# Patient Record
Sex: MALE | Race: WHITE | NOT HISPANIC OR LATINO | Employment: OTHER | URBAN - METROPOLITAN AREA
[De-identification: names, ages, dates, MRNs, and addresses within clinical notes are randomized per-mention and may not be internally consistent; named-entity substitution may affect disease eponyms.]

---

## 2023-04-01 PROBLEM — I73.9 PERIPHERAL ARTERY DISEASE (CMS-HCC): Status: ACTIVE | Noted: 2023-04-01

## 2023-04-01 PROBLEM — M25.552 LEFT HIP PAIN: Status: ACTIVE | Noted: 2023-04-01

## 2023-04-01 PROBLEM — I10 HYPERTENSION: Status: ACTIVE | Noted: 2023-04-01

## 2023-04-01 PROBLEM — K27.9 PEPTIC ULCER: Status: ACTIVE | Noted: 2023-04-01

## 2023-04-01 PROBLEM — R94.4 DECREASED GFR: Status: ACTIVE | Noted: 2023-04-01

## 2023-04-01 PROBLEM — E83.19 IRON EXCESS: Status: ACTIVE | Noted: 2023-04-01

## 2023-04-01 PROBLEM — R01.1 HEART MURMUR: Status: ACTIVE | Noted: 2023-04-01

## 2023-04-01 PROBLEM — I49.9 IRREGULAR HEART RHYTHM: Status: ACTIVE | Noted: 2023-04-01

## 2023-04-01 PROBLEM — M25.522 LEFT ELBOW PAIN: Status: ACTIVE | Noted: 2023-04-01

## 2023-04-01 PROBLEM — D64.9 ANEMIA, MILD: Status: ACTIVE | Noted: 2023-04-01

## 2023-04-01 PROBLEM — R73.03 PREDIABETES: Status: ACTIVE | Noted: 2023-04-01

## 2023-04-01 RX ORDER — LISINOPRIL 30 MG/1
30 TABLET ORAL DAILY
COMMUNITY
End: 2023-05-03 | Stop reason: SDUPTHER

## 2023-04-01 RX ORDER — ASPIRIN 81 MG/1
1 TABLET ORAL DAILY
COMMUNITY
Start: 2020-07-24 | End: 2023-10-23 | Stop reason: ALTCHOICE

## 2023-04-02 PROBLEM — M25.552 LEFT HIP PAIN: Status: RESOLVED | Noted: 2023-04-01 | Resolved: 2023-04-02

## 2023-04-02 PROBLEM — I49.9 IRREGULAR HEART RHYTHM: Status: RESOLVED | Noted: 2023-04-01 | Resolved: 2023-04-02

## 2023-04-02 PROBLEM — F17.200 CURRENT SMOKER: Status: ACTIVE | Noted: 2023-04-02

## 2023-04-02 PROBLEM — D64.9 ANEMIA, MILD: Status: RESOLVED | Noted: 2023-04-01 | Resolved: 2023-04-02

## 2023-04-02 PROBLEM — E83.19 IRON EXCESS: Status: RESOLVED | Noted: 2023-04-01 | Resolved: 2023-04-02

## 2023-04-02 ASSESSMENT — ENCOUNTER SYMPTOMS
WHEEZING: 0
SHORTNESS OF BREATH: 0
CONSTITUTIONAL NEGATIVE: 1

## 2023-04-02 NOTE — PROGRESS NOTES
Subjective   Patient ID: Anand Hartman is a 82 y.o. male who presents for Follow-up (3 month follow up for blood sugar/Fasting-No/Bps 120/80 a few days ago/).  Last physical: 9/8/22    Is pt fasting? no  BPs at home-120/80 a few dasy ago  Poc a1c=5.9        HPI  Last labs-9/14/22 cmp-sugar 111-gfr 57, ldl 107, psa nl, cbc nl  Due for labs- cmp    Cholesterol   Date Value Ref Range Status   08/27/2021 157 0 - 199 mg/dL Final     Comment:     .      AGE      DESIRABLE   BORDERLINE HIGH   HIGH     0-19 Y     0 - 169       170 - 199     >/= 200    20-24 Y     0 - 189       190 - 224     >/= 225         >24 Y     0 - 199       200 - 239     >/= 240   **All ranges are based on fasting samples. Specific   therapeutic targets will vary based on patient-specific   cardiac risk.  .   Pediatric guidelines reference:Pediatrics 2011, 128(S5).   Adult guidelines reference: NCEP ATPIII Guidelines,     COLLETTE 2001, 258:2486-97  .   Venipuncture immediately after or during the    administration of Metamizole may lead to falsely   low results. Testing should be performed immediately   prior to Metamizole dosing.     11/25/2020 110 0 - 199 mg/dL Final     Comment:     .      AGE      DESIRABLE   BORDERLINE HIGH   HIGH     0-19 Y     0 - 169       170 - 199     >/= 200    20-24 Y     0 - 189       190 - 224     >/= 225         >24 Y     0 - 199       200 - 239     >/= 240   **All ranges are based on fasting samples. Specific   therapeutic targets will vary based on patient-specific   cardiac risk.  .   Pediatric guidelines reference:Pediatrics 2011, 128(S5).   Adult guidelines reference: NCEP ATPIII Guidelines,     COLLETTE 2001, 258:2486-97  .   Venipuncture immediately after or during the    administration of Metamizole may lead to falsely   low results. Testing should be performed immediately   prior to Metamizole dosing.     03/09/2020 163 0 - 199 mg/dL Final     Comment:     .      AGE      DESIRABLE   BORDERLINE HIGH   HIGH     0-19  Y     0 - 169       170 - 199     >/= 200    20-24 Y     0 - 189       190 - 224     >/= 225         >24 Y     0 - 199       200 - 239     >/= 240   **All ranges are based on fasting samples. Specific   therapeutic targets will vary based on patient-specific   cardiac risk.  .   Pediatric guidelines reference:Pediatrics 2011, 128(S5).   Adult guidelines reference: NCEP ATPIII Guidelines,     COLLETTE 2001, 258:2486-97  .   Venipuncture immediately after or during the    administration of Metamizole may lead to falsely   low results. Testing should be performed immediately   prior to Metamizole dosing.       Triglycerides   Date Value Ref Range Status   08/27/2021 103 0 - 149 mg/dL Final     Comment:     .      AGE      DESIRABLE   BORDERLINE HIGH   HIGH     VERY HIGH   0 D-90 D    19 - 174         ----         ----        ----  91 D- 9 Y     0 -  74        75 -  99     >/= 100      ----    10-19 Y     0 -  89        90 - 129     >/= 130      ----    20-24 Y     0 - 114       115 - 149     >/= 150      ----         >24 Y     0 - 149       150 - 199    200- 499    >/= 500  .   Venipuncture immediately after or during the    administration of Metamizole may lead to falsely   low results. Testing should be performed immediately   prior to Metamizole dosing.     11/25/2020 70 0 - 149 mg/dL Final     Comment:     .      AGE      DESIRABLE   BORDERLINE HIGH   HIGH     VERY HIGH   0 D-90 D    19 - 174         ----         ----        ----  91 D- 9 Y     0 -  74        75 -  99     >/= 100      ----    10-19 Y     0 -  89        90 - 129     >/= 130      ----    20-24 Y     0 - 114       115 - 149     >/= 150      ----         >24 Y     0 - 149       150 - 199    200- 499    >/= 500  .   Venipuncture immediately after or during the    administration of Metamizole may lead to falsely   low results. Testing should be performed immediately   prior to Metamizole dosing.     03/09/2020 89 0 - 149 mg/dL Final     Comment:     .       AGE      DESIRABLE   BORDERLINE HIGH   HIGH     VERY HIGH   0 D-90 D    19 - 174         ----         ----        ----  91 D- 9 Y     0 -  74        75 -  99     >/= 100      ----    10-19 Y     0 -  89        90 - 129     >/= 130      ----    20-24 Y     0 - 114       115 - 149     >/= 150      ----         >24 Y     0 - 149       150 - 199    200- 499    >/= 500  .   Venipuncture immediately after or during the    administration of Metamizole may lead to falsely   low results. Testing should be performed immediately   prior to Metamizole dosing.       HDL   Date Value Ref Range Status   08/27/2021 40.4 mg/dL Final     Comment:     .      AGE      VERY LOW   LOW     NORMAL    HIGH       0-19 Y       < 35   < 40     40-45     ----    20-24 Y       ----   < 40       >45     ----      >24 Y       ----   < 40     40-60      >60  .     11/25/2020 41.6 mg/dL Final     Comment:     .      AGE      VERY LOW   LOW     NORMAL    HIGH       0-19 Y       < 35   < 40     40-45     ----    20-24 Y       ----   < 40       >45     ----      >24 Y       ----   < 40     40-60      >60  .     03/09/2020 42.9 mg/dL Final     Comment:     .      AGE      VERY LOW   LOW     NORMAL    HIGH       0-19 Y       < 35   < 40     40-45     ----    20-24 Y       ----   < 40       >45     ----      >24 Y       ----   < 40     40-60      >60  .       No results found for: LDL  TSH   Date Value Ref Range Status   08/27/2021 1.89 0.44 - 3.98 mIU/L Final     Comment:      TSH testing is performed using different testing    methodology at [facility] than at other    [facility]. Direct result comparisons should    only be made within the same method.       No components found for: A1C  No components found for: POCA1C  No results found for: ALBUR  No components found for: POCALBUR    Exercise-walking  Diet-breakfast- coffee with sugar or black;  2-3 eggs and toast (white)  Lunch-leftovers, sandwich, soup; coffee  Dinner-what he  finds; coffee, iced tea  Snacks-anything he can find  Etoh none    Immunization History   Administered Date(s) Administered    Moderna SARS-CoV-2 Vaccination 03/04/2021, 04/02/2021        No care team member to display     Review of Systems   Constitutional: Negative.    Respiratory:  Negative for shortness of breath and wheezing.    Cardiovascular:  Negative for chest pain.       Objective   Visit Vitals  /69   Pulse 101   Temp 35.8 °C (96.4 °F)      BP Readings from Last 3 Encounters:   04/05/23 138/69   12/05/22 (!) 160/92   09/08/22 162/88     Wt Readings from Last 3 Encounters:   04/05/23 52.1 kg (114 lb 12.8 oz)   12/05/22 52.9 kg (116 lb 9.6 oz)   09/12/22 52.2 kg (115 lb)       The ASCVD Risk score (Anuradha THOMASON, et al., 2019) failed to calculate for the following reasons:    The 2019 ASCVD risk score is only valid for ages 40 to 79     Physical Exam  Constitutional:       General: He is not in acute distress.     Appearance: Normal appearance.   Neurological:      Mental Status: He is alert.       Assessment/Plan   Diagnoses and all orders for this visit:  Primary hypertension  -     Comprehensive Metabolic Panel; Future  Prediabetes  -     POCT glycosylated hemoglobin (Hb A1C) manually resulted  Decreased GFR  -     Comprehensive Metabolic Panel; Future

## 2023-04-05 ENCOUNTER — OFFICE VISIT (OUTPATIENT)
Dept: PRIMARY CARE | Facility: CLINIC | Age: 83
End: 2023-04-05
Payer: COMMERCIAL

## 2023-04-05 VITALS
WEIGHT: 114.8 LBS | DIASTOLIC BLOOD PRESSURE: 69 MMHG | SYSTOLIC BLOOD PRESSURE: 138 MMHG | HEART RATE: 101 BPM | HEIGHT: 66 IN | TEMPERATURE: 96.4 F | BODY MASS INDEX: 18.45 KG/M2

## 2023-04-05 DIAGNOSIS — E46 PROTEIN-CALORIE MALNUTRITION, UNSPECIFIED SEVERITY (MULTI): ICD-10-CM

## 2023-04-05 DIAGNOSIS — R94.4 DECREASED GFR: ICD-10-CM

## 2023-04-05 DIAGNOSIS — I73.9 PERIPHERAL ARTERY DISEASE (CMS-HCC): ICD-10-CM

## 2023-04-05 DIAGNOSIS — I10 PRIMARY HYPERTENSION: Primary | ICD-10-CM

## 2023-04-05 DIAGNOSIS — R73.03 PREDIABETES: ICD-10-CM

## 2023-04-05 LAB — POC HEMOGLOBIN A1C: 5.9 % (ref 4.2–6.5)

## 2023-04-05 PROCEDURE — 99214 OFFICE O/P EST MOD 30 MIN: CPT | Performed by: NURSE PRACTITIONER

## 2023-04-05 PROCEDURE — 83036 HEMOGLOBIN GLYCOSYLATED A1C: CPT | Performed by: NURSE PRACTITIONER

## 2023-04-05 PROCEDURE — 3078F DIAST BP <80 MM HG: CPT | Performed by: NURSE PRACTITIONER

## 2023-04-05 PROCEDURE — 3075F SYST BP GE 130 - 139MM HG: CPT | Performed by: NURSE PRACTITIONER

## 2023-04-05 PROCEDURE — 1160F RVW MEDS BY RX/DR IN RCRD: CPT | Performed by: NURSE PRACTITIONER

## 2023-04-05 PROCEDURE — 1159F MED LIST DOCD IN RCRD: CPT | Performed by: NURSE PRACTITIONER

## 2023-04-05 ASSESSMENT — PATIENT HEALTH QUESTIONNAIRE - PHQ9
2. FEELING DOWN, DEPRESSED OR HOPELESS: NOT AT ALL
1. LITTLE INTEREST OR PLEASURE IN DOING THINGS: NOT AT ALL
SUM OF ALL RESPONSES TO PHQ9 QUESTIONS 1 AND 2: 0

## 2023-04-05 NOTE — PATIENT INSTRUCTIONS
A1C today=5.9  This is the 3 month average of your sugars.  You are in the normal range which is 5.6 or less.    You can use the lab in our building when fasting. The hrs are: Monday-Thursday, 7 a.m. - 6 p.m., Friday, 7 a.m. - 5 p.m., Saturday 8 a.m. - 12 noon.   No appt needed, BUT YOU DO NEED THE PAPER ORDER.    Fasting is no food, drink, gum or mints other than water for 8-12 hrs.   Results will be back in 2-3 business days for most labs. It is always recommended for any orders (labs, xrays, ultrasounds,MRI, ct scan, procedures etc) to check with your insurance provider for expected costs or expenses to you.       Goal LDL <100 (107)  Goal sugar fasting <100 (111)  Kidney function stable at last lab  Goal trigs <150  Goal HDL >50  Exercise-cardio 4-5d/wk 30min each day  Diet-Breakfast-toast (my favorite Pooja Watson Delightful multi-grain and Sixto's Killer Bread thin-sliced Good Seed)/bagel/English muffin-whole wheat flour as a 1st ingredient or cereal/oatmeal/granola bar-fiber 4g or more; protein like eggs or peanut butter is Ok  Lunch-protein, veg 1c  Dinner-protein, veg 1c; if having potatoes, rice, noodles, or pasta-->fist sized; could try brown rice or whole wheat pasta or quinoa  Fruit 2 a day  Dairy 2 a day-milk, almond milk, soy milk, yogurt, cottage cheese, yogurt  Snacks-Protein-hard boiled egg, nuts (walnuts/almonds/pecans/pistachios 1/4c), hummus, beef/deer jerky; vegetable, fruit, dairy-milk(1%, skim, almond, soy)/cheese (not a lot of cheddar)/yogurt (Greek is best-my favorite Dannon Fruit on the Bottom Greek)/cottage cheese 2%; triscuits, popcorn have a lot of fiber; serving size  Water  Limit alcohol to 2 drinks per day (1 drink=12oz beer or 5oz wine or 1 1/2oz liquor)  appt in 6   months; be fasting      I will communicate with you via Cadee regarding messages and results. If you need help with this, you can call the support line at [phone].    IT WAS A PLEASURE TO SEE YOU TODAY. THANK YOU FOR  CHOOSING US FOR YOUR HEALTHCARE NEEDS.

## 2023-04-18 LAB
NON-UH HIE A/G RATIO: 1.3
NON-UH HIE ALB: 3.9 G/DL (ref 3.4–5)
NON-UH HIE ALK PHOS: 116 UNIT/L (ref 46–116)
NON-UH HIE BILIRUBIN, TOTAL: 0.7 MG/DL (ref 0.2–1)
NON-UH HIE BUN/CREAT RATIO: 16.2
NON-UH HIE BUN: 21 MG/DL (ref 9–23)
NON-UH HIE CALCIUM: 9.2 MG/DL (ref 8.7–10.4)
NON-UH HIE CALCULATED OSMOLALITY: 281 MOSM/KG (ref 275–295)
NON-UH HIE CHLORIDE: 107 MMOL/L (ref 98–107)
NON-UH HIE CO2, VENOUS: 24 MMOL/L (ref 20–31)
NON-UH HIE CREATININE: 1.3 MG/DL (ref 0.6–1.1)
NON-UH HIE GFR AA: >60
NON-UH HIE GLOBULIN: 2.9 G/DL
NON-UH HIE GLOMERULAR FILTRATION RATE: 52 ML/MIN/1.73M?
NON-UH HIE GLUCOSE: 107 MG/DL (ref 74–106)
NON-UH HIE GOT: 19 UNIT/L (ref 15–37)
NON-UH HIE GPT: 10 UNIT/L (ref 10–49)
NON-UH HIE K: 4.5 MMOL/L (ref 3.5–5.1)
NON-UH HIE NA: 139 MMOL/L (ref 135–145)
NON-UH HIE TOTAL PROTEIN: 6.8 G/DL (ref 5.7–8.2)

## 2023-04-19 ENCOUNTER — TELEPHONE (OUTPATIENT)
Dept: PRIMARY CARE | Facility: CLINIC | Age: 83
End: 2023-04-19
Payer: COMMERCIAL

## 2023-04-19 NOTE — TELEPHONE ENCOUNTER
----- Message from TIMBO Gardner-CNP sent at 4/18/2023  5:09 PM EDT -----  Sugar (aka glucose), liver function and electrolytes in the CMP (comprehensive metabolic panel) were normal.  Kidney function stable  If pt was not fasting sugar is normal

## 2023-05-03 ENCOUNTER — TELEPHONE (OUTPATIENT)
Dept: PRIMARY CARE | Facility: CLINIC | Age: 83
End: 2023-05-03
Payer: COMMERCIAL

## 2023-05-03 DIAGNOSIS — I10 PRIMARY HYPERTENSION: Primary | ICD-10-CM

## 2023-05-03 RX ORDER — LISINOPRIL 30 MG/1
30 TABLET ORAL DAILY
Qty: 90 TABLET | Refills: 1 | Status: SHIPPED | OUTPATIENT
Start: 2023-05-03 | End: 2023-11-20

## 2023-09-14 ASSESSMENT — ENCOUNTER SYMPTOMS
SORE THROAT: 0
FREQUENCY: 0
DYSPHORIC MOOD: 0
CONSTIPATION: 0
ARTHRALGIAS: 0
POLYPHAGIA: 0
ABDOMINAL PAIN: 0
EYE REDNESS: 0
DIZZINESS: 0
WHEEZING: 0
FEVER: 0
DYSURIA: 0
FATIGUE: 0
NAUSEA: 0
EYE DISCHARGE: 0
CHILLS: 0
HEADACHES: 0
SHORTNESS OF BREATH: 0
BRUISES/BLEEDS EASILY: 0
ADENOPATHY: 0
MYALGIAS: 0
RHINORRHEA: 0
COUGH: 0
BLOOD IN STOOL: 0
POLYDIPSIA: 0
DIARRHEA: 0
PALPITATIONS: 0
VOMITING: 0
NERVOUS/ANXIOUS: 0
HEMATURIA: 0

## 2023-09-14 NOTE — PROGRESS NOTES
Subjective   Patient ID: Anand Hartman is a 83 y.o. male who presents for Medicare Annual Wellness Visit Subsequent.    This is a medicare wellness. Pls ask all medicare questions  Is pt fasting? No  Does pt see any providers other than care team below:  No  IMMANUEL Saucedo  Does pt want to discuss quitting smoking? No  Bps at home-averaging the same as today, only checking occasionally   Does pt want flu shot? No  Prevnar-    No care team member to display    HPI  Last labs-4/2023 A1c 5.9, kidney function stable; 9/14/22 cmp-sugar 111-gfr 57, ldl 107, psa nl, cbc nl   Due for labs- all    Cholesterol   Date Value Ref Range Status   08/27/2021 157 0 - 199 mg/dL Final     Comment:     .      AGE      DESIRABLE   BORDERLINE HIGH   HIGH     0-19 Y     0 - 169       170 - 199     >/= 200    20-24 Y     0 - 189       190 - 224     >/= 225         >24 Y     0 - 199       200 - 239     >/= 240   **All ranges are based on fasting samples. Specific   therapeutic targets will vary based on patient-specific   cardiac risk.  .   Pediatric guidelines reference:Pediatrics 2011, 128(S5).   Adult guidelines reference: NCEP ATPIII Guidelines,     COLLETTE 2001, 258:2486-97  .   Venipuncture immediately after or during the    administration of Metamizole may lead to falsely   low results. Testing should be performed immediately   prior to Metamizole dosing.     11/25/2020 110 0 - 199 mg/dL Final     Comment:     .      AGE      DESIRABLE   BORDERLINE HIGH   HIGH     0-19 Y     0 - 169       170 - 199     >/= 200    20-24 Y     0 - 189       190 - 224     >/= 225         >24 Y     0 - 199       200 - 239     >/= 240   **All ranges are based on fasting samples. Specific   therapeutic targets will vary based on patient-specific   cardiac risk.  .   Pediatric guidelines reference:Pediatrics 2011, 128(S5).   Adult guidelines reference: NCEP ATPIII Guidelines,     COLLETTE 2001, 258:2486-97  .   Venipuncture immediately after or during the     administration of Metamizole may lead to falsely   low results. Testing should be performed immediately   prior to Metamizole dosing.     03/09/2020 163 0 - 199 mg/dL Final     Comment:     .      AGE      DESIRABLE   BORDERLINE HIGH   HIGH     0-19 Y     0 - 169       170 - 199     >/= 200    20-24 Y     0 - 189       190 - 224     >/= 225         >24 Y     0 - 199       200 - 239     >/= 240   **All ranges are based on fasting samples. Specific   therapeutic targets will vary based on patient-specific   cardiac risk.  .   Pediatric guidelines reference:Pediatrics 2011, 128(S5).   Adult guidelines reference: NCEP ATPIII Guidelines,     COLLETTE 2001, 258:2486-97  .   Venipuncture immediately after or during the    administration of Metamizole may lead to falsely   low results. Testing should be performed immediately   prior to Metamizole dosing.       Triglycerides   Date Value Ref Range Status   08/27/2021 103 0 - 149 mg/dL Final     Comment:     .      AGE      DESIRABLE   BORDERLINE HIGH   HIGH     VERY HIGH   0 D-90 D    19 - 174         ----         ----        ----  91 D- 9 Y     0 -  74        75 -  99     >/= 100      ----    10-19 Y     0 -  89        90 - 129     >/= 130      ----    20-24 Y     0 - 114       115 - 149     >/= 150      ----         >24 Y     0 - 149       150 - 199    200- 499    >/= 500  .   Venipuncture immediately after or during the    administration of Metamizole may lead to falsely   low results. Testing should be performed immediately   prior to Metamizole dosing.     11/25/2020 70 0 - 149 mg/dL Final     Comment:     .      AGE      DESIRABLE   BORDERLINE HIGH   HIGH     VERY HIGH   0 D-90 D    19 - 174         ----         ----        ----  91 D- 9 Y     0 -  74        75 -  99     >/= 100      ----    10-19 Y     0 -  89        90 - 129     >/= 130      ----    20-24 Y     0 - 114       115 - 149     >/= 150      ----         >24 Y     0 - 149       150 - 199    200- 499    >/=  "500  .   Venipuncture immediately after or during the    administration of Metamizole may lead to falsely   low results. Testing should be performed immediately   prior to Metamizole dosing.     03/09/2020 89 0 - 149 mg/dL Final     Comment:     .      AGE      DESIRABLE   BORDERLINE HIGH   HIGH     VERY HIGH   0 D-90 D    19 - 174         ----         ----        ----  91 D- 9 Y     0 -  74        75 -  99     >/= 100      ----    10-19 Y     0 -  89        90 - 129     >/= 130      ----    20-24 Y     0 - 114       115 - 149     >/= 150      ----         >24 Y     0 - 149       150 - 199    200- 499    >/= 500  .   Venipuncture immediately after or during the    administration of Metamizole may lead to falsely   low results. Testing should be performed immediately   prior to Metamizole dosing.       HDL   Date Value Ref Range Status   08/27/2021 40.4 mg/dL Final     Comment:     .      AGE      VERY LOW   LOW     NORMAL    HIGH       0-19 Y       < 35   < 40     40-45     ----    20-24 Y       ----   < 40       >45     ----      >24 Y       ----   < 40     40-60      >60  .     11/25/2020 41.6 mg/dL Final     Comment:     .      AGE      VERY LOW   LOW     NORMAL    HIGH       0-19 Y       < 35   < 40     40-45     ----    20-24 Y       ----   < 40       >45     ----      >24 Y       ----   < 40     40-60      >60  .     03/09/2020 42.9 mg/dL Final     Comment:     .      AGE      VERY LOW   LOW     NORMAL    HIGH       0-19 Y       < 35   < 40     40-45     ----    20-24 Y       ----   < 40       >45     ----      >24 Y       ----   < 40     40-60      >60  .       No results found for: \"LDL\"  TSH   Date Value Ref Range Status   08/27/2021 1.89 0.44 - 3.98 mIU/L Final     Comment:      TSH testing is performed using different testing    methodology at [facility] than at other    [facility] hospitals. Direct result comparisons should    only be made within the same method.       No results found for: " "\"A1C\"  No components found for: \"POCA1C\"  No results found for: \"ALBUR\"  No components found for: \"POCALBUR\"      Other concerns: if walks too far, lf hip gives out x 1yr  Clears up if rests  No redness rash or swelling  No fall or injury      ER/urgicare visits in the last year- none  Hospitalizations in the last year- none    FH colon ca-none  FH prostate ca-none      Exercise- walking  Diet-when hungry   Body mass index is 18.01 kg/m².    last eye dr appt- 1 wk ago  No vision issues    last dental appt- top and bottom dentures    BMs-regular  Sleep-able to fall asleep and stay asleep; no snoring or apnea  no cp, swelling, sob, abd pain, n/v/d/c, blood in stool or black stools  STI testing including hiv (age 15-65) and hep c screening (18-79)-declines  PSA 50-85 with labs      Immunization History   Administered Date(s) Administered    Moderna SARS-CoV-2 Vaccination 03/04/2021, 04/02/2021       fractures in lifetime-none  FH hip/spine/wrist/humerus fx in mom, dad or sibs-none    FH heart attack, heart surgery-mom-pacemaker,  Dad-cad    FH stroke-dad    The ASCVD Risk score (Anuradha DK, et al., 2019) failed to calculate for the following reasons:    The 2019 ASCVD risk score is only valid for ages 40 to 79  Coronary Artery Calcium score:  This test is recommended for men 45 or older and women 55 or older without a history of heart disease and have 1 risk factor (high LDL cholesterol, low HDL cholesterol, high blood pressure, smoker (current or past), type 2 diabetes, IBD, lupus, RA, ankylosing spondylitis, psoriasis or family history of  heart disease <55yrs in dad, brother or child or <65yrs in mom, sister, or child.)       Review of Systems   Constitutional:  Negative for chills, fatigue and fever.   HENT:  Negative for congestion, ear pain, postnasal drip, rhinorrhea and sore throat.    Eyes:  Negative for discharge, redness and visual disturbance.   Respiratory:  Negative for cough, shortness of breath and " wheezing.    Cardiovascular:  Negative for chest pain, palpitations and leg swelling.   Gastrointestinal:  Negative for abdominal pain, blood in stool, constipation, diarrhea, nausea and vomiting.   Endocrine: Negative for cold intolerance, polydipsia, polyphagia and polyuria.   Genitourinary:  Negative for dysuria, frequency, genital sores, hematuria, penile pain, testicular pain and urgency.   Musculoskeletal:  Negative for arthralgias and myalgias.   Skin:  Negative for rash.   Neurological:  Negative for dizziness, syncope and headaches.   Hematological:  Negative for adenopathy. Does not bruise/bleed easily.   Psychiatric/Behavioral:  Negative for dysphoric mood. The patient is not nervous/anxious.        Objective   Visit Vitals  /84   Pulse 78   Temp 36.7 °C (98 °F)      BP Readings from Last 3 Encounters:   09/15/23 127/84   04/05/23 138/69   01/06/23 101/60     Wt Readings from Last 3 Encounters:   09/15/23 50.6 kg (111 lb 9.6 oz)   04/05/23 52.1 kg (114 lb 12.8 oz)   01/06/23 51.7 kg (114 lb)           Physical Exam  Vitals reviewed.   Constitutional:       General: He is not in acute distress.     Appearance: Normal appearance. He is not ill-appearing.   HENT:      Head: Normocephalic.      Right Ear: Tympanic membrane, ear canal and external ear normal.      Left Ear: Tympanic membrane, ear canal and external ear normal.      Nose: Nose normal.      Mouth/Throat:      Mouth: Mucous membranes are moist.      Pharynx: Oropharynx is clear. No oropharyngeal exudate or posterior oropharyngeal erythema.   Eyes:      Extraocular Movements: Extraocular movements intact.      Conjunctiva/sclera: Conjunctivae normal.      Pupils: Pupils are equal, round, and reactive to light.   Cardiovascular:      Rate and Rhythm: Normal rate and regular rhythm.      Heart sounds: Normal heart sounds. No murmur heard.  Pulmonary:      Effort: Pulmonary effort is normal. No respiratory distress.      Breath sounds: Normal  breath sounds. No wheezing, rhonchi or rales.   Abdominal:      General: Bowel sounds are normal. There is no distension.      Palpations: Abdomen is soft. There is no mass.      Tenderness: There is no abdominal tenderness.   Musculoskeletal:         General: No swelling or tenderness. Normal range of motion.      Cervical back: Normal range of motion and neck supple. No tenderness.      Right lower leg: No edema.      Left lower leg: No edema.   Lymphadenopathy:      Cervical: No cervical adenopathy.   Skin:     General: Skin is warm.      Findings: No rash.   Neurological:      General: No focal deficit present.      Mental Status: He is alert and oriented to person, place, and time.      Cranial Nerves: No cranial nerve deficit.   Psychiatric:         Mood and Affect: Mood normal.         Behavior: Behavior normal.         Assessment/Plan   Diagnoses and all orders for this visit:  Routine general medical examination at a health care facility  Primary hypertension  -     Lipid Panel; Future  -     Comprehensive Metabolic Panel; Future  -     CBC and Auto Differential; Future  Prediabetes  -     Comprehensive Metabolic Panel; Future  -     Hemoglobin A1c; Future  Screening for malignant neoplasm of prostate  -     Prostate Specific Antigen, Screen; Future  BMI less than 19,adult  Left hip pain  -     XR hip left 2 or 3 views; Future  -     Referral to Orthopaedic Surgery; Future  Other orders  -     Follow Up In Primary Care - Established; Future

## 2023-09-15 ENCOUNTER — OFFICE VISIT (OUTPATIENT)
Dept: PRIMARY CARE | Facility: CLINIC | Age: 83
End: 2023-09-15
Payer: COMMERCIAL

## 2023-09-15 VITALS
HEIGHT: 66 IN | HEART RATE: 78 BPM | SYSTOLIC BLOOD PRESSURE: 127 MMHG | DIASTOLIC BLOOD PRESSURE: 84 MMHG | TEMPERATURE: 98 F | BODY MASS INDEX: 17.94 KG/M2 | WEIGHT: 111.6 LBS

## 2023-09-15 DIAGNOSIS — Z00.00 ROUTINE GENERAL MEDICAL EXAMINATION AT A HEALTH CARE FACILITY: Primary | ICD-10-CM

## 2023-09-15 DIAGNOSIS — R73.03 PREDIABETES: ICD-10-CM

## 2023-09-15 DIAGNOSIS — Z12.5 SCREENING FOR MALIGNANT NEOPLASM OF PROSTATE: ICD-10-CM

## 2023-09-15 DIAGNOSIS — I10 PRIMARY HYPERTENSION: ICD-10-CM

## 2023-09-15 DIAGNOSIS — M25.552 LEFT HIP PAIN: ICD-10-CM

## 2023-09-15 PROCEDURE — 1159F MED LIST DOCD IN RCRD: CPT | Performed by: NURSE PRACTITIONER

## 2023-09-15 PROCEDURE — 1160F RVW MEDS BY RX/DR IN RCRD: CPT | Performed by: NURSE PRACTITIONER

## 2023-09-15 PROCEDURE — G0444 DEPRESSION SCREEN ANNUAL: HCPCS | Performed by: NURSE PRACTITIONER

## 2023-09-15 PROCEDURE — 1126F AMNT PAIN NOTED NONE PRSNT: CPT | Performed by: NURSE PRACTITIONER

## 2023-09-15 PROCEDURE — 3074F SYST BP LT 130 MM HG: CPT | Performed by: NURSE PRACTITIONER

## 2023-09-15 PROCEDURE — G0439 PPPS, SUBSEQ VISIT: HCPCS | Performed by: NURSE PRACTITIONER

## 2023-09-15 PROCEDURE — 3079F DIAST BP 80-89 MM HG: CPT | Performed by: NURSE PRACTITIONER

## 2023-09-15 PROCEDURE — 1170F FXNL STATUS ASSESSED: CPT | Performed by: NURSE PRACTITIONER

## 2023-09-15 ASSESSMENT — PATIENT HEALTH QUESTIONNAIRE - PHQ9
2. FEELING DOWN, DEPRESSED OR HOPELESS: NOT AT ALL
SUM OF ALL RESPONSES TO PHQ9 QUESTIONS 1 AND 2: 0
1. LITTLE INTEREST OR PLEASURE IN DOING THINGS: NOT AT ALL

## 2023-09-15 ASSESSMENT — ACTIVITIES OF DAILY LIVING (ADL)
DOING_HOUSEWORK: INDEPENDENT
MANAGING_FINANCES: INDEPENDENT
DRESSING: INDEPENDENT
TAKING_MEDICATION: INDEPENDENT
GROCERY_SHOPPING: INDEPENDENT
BATHING: INDEPENDENT

## 2023-09-15 NOTE — PATIENT INSTRUCTIONS
Xray hip today  Refer to hip ortho       Handouts given to pt:  physical handout    stop smoking      Labs:    You can use the lab in our building when fasting. The hrs are: Monday-Friday, 7 a.m. - 5 p.m., Saturday 8 a.m. - 12 noon.   No appt needed, BUT YOU DO NEED THE PAPER ORDER.    Fasting is no food, drink, gum or mints other than water for 12 hrs.   Results will be back in 2-3 business days for most labs. It is always recommended for any orders (labs, xrays, ultrasounds,MRI, ct scan, procedures etc) to check with your insurance provider for expected costs or expenses to you.         You will get your results via phone from my medical assistant if you do not have MyChart.  OR  You will get your results via CDPt    If a result is urgent, I will call to speak to you.    Vaccines:  Declines flu shot    General recommendations:  Exercise-cardio 4-5d/wk 30min each day  Diet-Breakfast-toast (my favorite Pooja Watson Delighful Multigrain or Sixto's Killer Bread Good Seed thin-sliced)/bagel/English muffin-whole wheat flour as a 1st ingredient or cereal/oatmeal/granola bar-fiber 4g or more or protein like eggs or peanut butter; optional veggies  Lunch-protein, 1/2c carb or 2 slices bread, veg 1c  Dinner-protein, fist sized carb, veg 1c  Fruit 2 a day  Dairy 2 a day-milk, soy milk, almond milk, cheese, yogurt, cottage cheese  Snacks-Protein-hard boiled egg, nuts (walnuts/almonds/pecans/pistachios 1/4c), hummus, beef/deer jerky or meat sticks; vegetable, fruit, dairy-milk(1%, skim, almond, soy)/cheese (not a lot of cheddar)/yogurt (Greek is best-my favorite Dannon Fruit on the Bottom Greek)/cottage cheese 2%; triscuits/ popcorn/wheat thins have a lot of fiber; follow serving size on bag/box/container  increase water  Limit alcohol to 1 drink per day for women and 2 drinks per day for men (1 drink=12oz beer or 5oz wine or 1 1/2oz liquor)  Calcium: 500mg 1 twice a day if age 50 and younger and 600mg 1 twice a day if over  age 50 (calcium citrate can be taken without food)  Vitamin D: 800-5000 IU/day  Limit salt to <2300mg a day if age 50 and under and <1500mg a day if over age 50/have high bp or diabetes or kidney disease  Recommend folate for childbearing age women 0.4mg per day (can be found in a multivitamin)  Recommend 18mg/dL of iron a day if age 50 and under and 8mg/dL a day if over age 50; take on an empty stomach at bedtime  Use sunscreen   Wear seatbelt  Recommend safe sex practices: using condoms everytime you have sex, discuss with a new partner about their past partners/history of STDs/drug use, avoid drinking alcohol or using drugs as this increases the chance that you will participate in high-risk sex, for oral sex help protect your mouth by having your partner use a condom (male or female), women should not douche after sex, be aware of your partner's body and your body-look for signs of a sore, blister, rash, or discharge, and have regular exams and periodic tests for STDs.  No distracted driving  No driving when under influence of substances  Wear a seatbelt  Eye dr every 1-2yrs  Dentist every 6-12 mon  Tetanus shot every 10yrs  Recommend flu vaccine in the fall  Appt in 6mon for follow up on bp and sugar and 1 year for physical      I will communicate with you via Upshot regarding messages and results. If you need help with this, you can call the support line at [phone].    IT WAS A PLEASURE TO SEE YOU TODAY. THANK YOU FOR CHOOSING US FOR YOUR HEALTHCARE NEEDS.

## 2023-09-18 ENCOUNTER — TELEPHONE (OUTPATIENT)
Dept: PRIMARY CARE | Facility: CLINIC | Age: 83
End: 2023-09-18
Payer: COMMERCIAL

## 2023-09-26 ENCOUNTER — LAB (OUTPATIENT)
Dept: LAB | Facility: LAB | Age: 83
End: 2023-09-26
Payer: COMMERCIAL

## 2023-09-26 ENCOUNTER — TELEPHONE (OUTPATIENT)
Dept: PRIMARY CARE | Facility: CLINIC | Age: 83
End: 2023-09-26

## 2023-09-26 DIAGNOSIS — Z12.5 SCREENING FOR MALIGNANT NEOPLASM OF PROSTATE: ICD-10-CM

## 2023-09-26 DIAGNOSIS — R73.03 PREDIABETES: ICD-10-CM

## 2023-09-26 DIAGNOSIS — I10 PRIMARY HYPERTENSION: ICD-10-CM

## 2023-09-26 DIAGNOSIS — I10 PRIMARY HYPERTENSION: Primary | ICD-10-CM

## 2023-09-26 LAB
BASOPHILS (10*3/UL) IN BLOOD BY AUTOMATED COUNT: 0.06 X10E9/L (ref 0–0.1)
BASOPHILS/100 LEUKOCYTES IN BLOOD BY AUTOMATED COUNT: 0.9 % (ref 0–2)
EOSINOPHILS (10*3/UL) IN BLOOD BY AUTOMATED COUNT: 0.18 X10E9/L (ref 0–0.4)
EOSINOPHILS/100 LEUKOCYTES IN BLOOD BY AUTOMATED COUNT: 2.6 % (ref 0–6)
ERYTHROCYTE DISTRIBUTION WIDTH (RATIO) BY AUTOMATED COUNT: 16.1 % (ref 11.5–14.5)
ERYTHROCYTE MEAN CORPUSCULAR HEMOGLOBIN CONCENTRATION (G/DL) BY AUTOMATED: 30.8 G/DL (ref 32–36)
ERYTHROCYTE MEAN CORPUSCULAR VOLUME (FL) BY AUTOMATED COUNT: 102 FL (ref 80–100)
ERYTHROCYTES (10*6/UL) IN BLOOD BY AUTOMATED COUNT: 4.55 X10E12/L (ref 4.5–5.9)
HEMATOCRIT (%) IN BLOOD BY AUTOMATED COUNT: 46.5 % (ref 41–52)
HEMOGLOBIN (G/DL) IN BLOOD: 14.3 G/DL (ref 13.5–17.5)
IMMATURE GRANULOCYTES/100 LEUKOCYTES IN BLOOD BY AUTOMATED COUNT: 0.3 % (ref 0–0.9)
LEUKOCYTES (10*3/UL) IN BLOOD BY AUTOMATED COUNT: 6.8 X10E9/L (ref 4.4–11.3)
LYMPHOCYTES (10*3/UL) IN BLOOD BY AUTOMATED COUNT: 2.1 X10E9/L (ref 0.8–3)
LYMPHOCYTES/100 LEUKOCYTES IN BLOOD BY AUTOMATED COUNT: 30.7 % (ref 13–44)
MONOCYTES (10*3/UL) IN BLOOD BY AUTOMATED COUNT: 0.68 X10E9/L (ref 0.05–0.8)
MONOCYTES/100 LEUKOCYTES IN BLOOD BY AUTOMATED COUNT: 10 % (ref 2–10)
NEUTROPHILS (10*3/UL) IN BLOOD BY AUTOMATED COUNT: 3.79 X10E9/L (ref 1.6–5.5)
NEUTROPHILS/100 LEUKOCYTES IN BLOOD BY AUTOMATED COUNT: 55.5 % (ref 40–80)
NRBC (PER 100 WBCS) BY AUTOMATED COUNT: 0 /100 WBC (ref 0–0)
PLATELETS (10*3/UL) IN BLOOD AUTOMATED COUNT: 318 X10E9/L (ref 150–450)
PROSTATE SPECIFIC ANTIGEN,SCREEN: 2.3 NG/ML (ref 0–4)

## 2023-09-26 PROCEDURE — 85025 COMPLETE CBC W/AUTO DIFF WBC: CPT

## 2023-09-26 PROCEDURE — 36415 COLL VENOUS BLD VENIPUNCTURE: CPT

## 2023-09-26 PROCEDURE — 80061 LIPID PANEL: CPT

## 2023-09-26 PROCEDURE — G0103 PSA SCREENING: HCPCS

## 2023-09-26 PROCEDURE — 80053 COMPREHEN METABOLIC PANEL: CPT

## 2023-09-27 LAB
ALANINE AMINOTRANSFERASE (SGPT) (U/L) IN SER/PLAS: 10 U/L (ref 10–52)
ALBUMIN (G/DL) IN SER/PLAS: 4.3 G/DL (ref 3.4–5)
ALKALINE PHOSPHATASE (U/L) IN SER/PLAS: 97 U/L (ref 33–136)
ANION GAP IN SER/PLAS: 18 MMOL/L (ref 10–20)
ASPARTATE AMINOTRANSFERASE (SGOT) (U/L) IN SER/PLAS: 17 U/L (ref 9–39)
BILIRUBIN TOTAL (MG/DL) IN SER/PLAS: 0.5 MG/DL (ref 0–1.2)
CALCIUM (MG/DL) IN SER/PLAS: 9.6 MG/DL (ref 8.6–10.6)
CARBON DIOXIDE, TOTAL (MMOL/L) IN SER/PLAS: 19 MMOL/L (ref 21–32)
CHLORIDE (MMOL/L) IN SER/PLAS: 105 MMOL/L (ref 98–107)
CHOLESTEROL (MG/DL) IN SER/PLAS: 176 MG/DL (ref 0–199)
CHOLESTEROL IN HDL (MG/DL) IN SER/PLAS: 48.2 MG/DL
CHOLESTEROL/HDL RATIO: 3.7
CREATININE (MG/DL) IN SER/PLAS: 1.39 MG/DL (ref 0.5–1.3)
GFR MALE: 50 ML/MIN/1.73M2
GLUCOSE (MG/DL) IN SER/PLAS: 103 MG/DL (ref 74–99)
LDL: 104 MG/DL (ref 0–99)
POTASSIUM (MMOL/L) IN SER/PLAS: 4.6 MMOL/L (ref 3.5–5.3)
PROTEIN TOTAL: 6.9 G/DL (ref 6.4–8.2)
SODIUM (MMOL/L) IN SER/PLAS: 137 MMOL/L (ref 136–145)
TRIGLYCERIDE (MG/DL) IN SER/PLAS: 120 MG/DL (ref 0–149)
UREA NITROGEN (MG/DL) IN SER/PLAS: 28 MG/DL (ref 6–23)
VLDL: 24 MG/DL (ref 0–40)

## 2023-09-28 ENCOUNTER — TELEPHONE (OUTPATIENT)
Dept: PRIMARY CARE | Facility: CLINIC | Age: 83
End: 2023-09-28
Payer: COMMERCIAL

## 2023-09-28 NOTE — LETTER
October 9, 2023     Anand Hartman  [address] [facility] [address]    Patient: Aannd Hartman   YOB: 1940   Date of Visit: 9/28/2023         Dear Anand,    We have made several attempts to contact you by phone regarding your lab results with no success.  If you could please respond to the letter with a call to our office at [phone] option 1 so that we can give you your results.      Sincerely,          Amanda Barr, TIMBO-CNP

## 2023-09-28 NOTE — TELEPHONE ENCOUNTER
Hdl and trigs normal.   Ldl just slightly high at 104. Gaol <100. Decr fats and incr fiber.    liver function and electrolytes in the CMP (comprehensive metabolic panel) were normal.   Sugar high at 103. Goal <100. Decr carbs and sugars. Pt has known prediabetes.   Kidney function stable. Continue to increase fluids and decrease ibuprofen, motrin, advil, naproxen, aleve and other anti-inflammatories. Eat a low sodium balanced diet. Exercise regularly. Keep your blood pressure <130/<80. Keep sugars to goal.   CBC (complete blood count) was normal which looks at infection and anemia markers.   PSA (prostate blood test) is normal.

## 2023-10-21 ASSESSMENT — ENCOUNTER SYMPTOMS
CONSTITUTIONAL NEGATIVE: 1
WHEEZING: 0

## 2023-10-21 NOTE — PROGRESS NOTES
Subjective   Patient ID: Anand Hartman is a 83 y.o. male who presents for No chief complaint on file..  Last physical:9/15/23  Does pt want flu shot?no  Does pt want a pneumonia shot? no    Pls ask pt why he is here today. Wants to go over blood work  He was not due to be seen until march.  We just couldn't get ahold of him to give him his lab results.   Did he set up ortho dr appt re: hip? No, declines      HPI  Last labs-9/2023 Hdl and trigs normal.   Ldl just slightly high at 104. Goal <100. Decr fats and incr fiber.    liver function and electrolytes in the CMP (comprehensive metabolic panel) were normal.   Sugar high at 103. Goal <100. Decr carbs and sugars. Pt has known prediabetes.   Kidney function stable. Continue to increase fluids and decrease ibuprofen, motrin, advil, naproxen, aleve and other anti-inflammatories. Eat a low sodium balanced diet. Exercise regularly. Keep your blood pressure <130/<80. Keep sugars to goal.  Taking 2 ibuprofen a day.  CBC (complete blood count) was normal which looks at infection and anemia markers.   PSA (prostate blood test) is normal.   Due for labs- none    Cholesterol   Date Value Ref Range Status   09/26/2023 176 0 - 199 mg/dL Final     Comment:     .      AGE      DESIRABLE   BORDERLINE HIGH   HIGH     0-19 Y     0 - 169       170 - 199     >/= 200    20-24 Y     0 - 189       190 - 224     >/= 225         >24 Y     0 - 199       200 - 239     >/= 240   **All ranges are based on fasting samples. Specific   therapeutic targets will vary based on patient-specific   cardiac risk.  .   Pediatric guidelines reference:Pediatrics 2011, 128(S5).   Adult guidelines reference: NCEP ATPIII Guidelines,     COLLETTE 2001, 258:2486-97  .   Venipuncture immediately after or during the    administration of Metamizole may lead to falsely   low results. Testing should be performed immediately   prior to Metamizole dosing.   08/27/2021 157 0 - 199 mg/dL Final     Comment:     .      AGE       DESIRABLE   BORDERLINE HIGH   HIGH     0-19 Y     0 - 169       170 - 199     >/= 200    20-24 Y     0 - 189       190 - 224     >/= 225         >24 Y     0 - 199       200 - 239     >/= 240   **All ranges are based on fasting samples. Specific   therapeutic targets will vary based on patient-specific   cardiac risk.  .   Pediatric guidelines reference:Pediatrics 2011, 128(S5).   Adult guidelines reference: NCEP ATPIII Guidelines,     COLLETTE 2001, 258:2486-97  .   Venipuncture immediately after or during the    administration of Metamizole may lead to falsely   low results. Testing should be performed immediately   prior to Metamizole dosing.     11/25/2020 110 0 - 199 mg/dL Final     Comment:     .      AGE      DESIRABLE   BORDERLINE HIGH   HIGH     0-19 Y     0 - 169       170 - 199     >/= 200    20-24 Y     0 - 189       190 - 224     >/= 225         >24 Y     0 - 199       200 - 239     >/= 240   **All ranges are based on fasting samples. Specific   therapeutic targets will vary based on patient-specific   cardiac risk.  .   Pediatric guidelines reference:Pediatrics 2011, 128(S5).   Adult guidelines reference: NCEP ATPIII Guidelines,     COLLETTE 2001, 258:2486-97  .   Venipuncture immediately after or during the    administration of Metamizole may lead to falsely   low results. Testing should be performed immediately   prior to Metamizole dosing.       Triglycerides   Date Value Ref Range Status   09/26/2023 120 0 - 149 mg/dL Final     Comment:     .      AGE      DESIRABLE   BORDERLINE HIGH   HIGH     VERY HIGH   0 D-90 D    19 - 174         ----         ----        ----  91 D- 9 Y     0 -  74        75 -  99     >/= 100      ----    10-19 Y     0 -  89        90 - 129     >/= 130      ----    20-24 Y     0 - 114       115 - 149     >/= 150      ----         >24 Y     0 - 149       150 - 199    200- 499    >/= 500  .   Venipuncture immediately after or during the    administration of Metamizole may lead to  falsely   low results. Testing should be performed immediately   prior to Metamizole dosing.   08/27/2021 103 0 - 149 mg/dL Final     Comment:     .      AGE      DESIRABLE   BORDERLINE HIGH   HIGH     VERY HIGH   0 D-90 D    19 - 174         ----         ----        ----  91 D- 9 Y     0 -  74        75 -  99     >/= 100      ----    10-19 Y     0 -  89        90 - 129     >/= 130      ----    20-24 Y     0 - 114       115 - 149     >/= 150      ----         >24 Y     0 - 149       150 - 199    200- 499    >/= 500  .   Venipuncture immediately after or during the    administration of Metamizole may lead to falsely   low results. Testing should be performed immediately   prior to Metamizole dosing.     11/25/2020 70 0 - 149 mg/dL Final     Comment:     .      AGE      DESIRABLE   BORDERLINE HIGH   HIGH     VERY HIGH   0 D-90 D    19 - 174         ----         ----        ----  91 D- 9 Y     0 -  74        75 -  99     >/= 100      ----    10-19 Y     0 -  89        90 - 129     >/= 130      ----    20-24 Y     0 - 114       115 - 149     >/= 150      ----         >24 Y     0 - 149       150 - 199    200- 499    >/= 500  .   Venipuncture immediately after or during the    administration of Metamizole may lead to falsely   low results. Testing should be performed immediately   prior to Metamizole dosing.       HDL   Date Value Ref Range Status   09/26/2023 48.2 mg/dL Final     Comment:     .      AGE      VERY LOW   LOW     NORMAL    HIGH       0-19 Y       < 35   < 40     40-45     ----    20-24 Y       ----   < 40       >45     ----      >24 Y       ----   < 40     40-60      >60  .   08/27/2021 40.4 mg/dL Final     Comment:     .      AGE      VERY LOW   LOW     NORMAL    HIGH       0-19 Y       < 35   < 40     40-45     ----    20-24 Y       ----   < 40       >45     ----      >24 Y       ----   < 40     40-60      >60  .     11/25/2020 41.6 mg/dL Final     Comment:     .      AGE      VERY LOW   LOW     NORMAL     "HIGH       0-19 Y       < 35   < 40     40-45     ----    20-24 Y       ----   < 40       >45     ----      >24 Y       ----   < 40     40-60      >60  .       No results found for: \"LDL\"  TSH   Date Value Ref Range Status   08/27/2021 1.89 0.44 - 3.98 mIU/L Final     Comment:      TSH testing is performed using different testing    methodology at [facility] than at other    [facility] hospitals. Direct result comparisons should    only be made within the same method.       No results found for: \"A1C\"  No components found for: \"POCA1C\"  No results found for: \"ALBUR\"  No components found for: \"POCALBUR\"      Immunization History   Administered Date(s) Administered    Moderna SARS-CoV-2 Vaccination 03/04/2021, 04/02/2021        No care team member to display     Review of Systems   Constitutional: Negative.  Negative for chills and fever.   Respiratory:  Positive for shortness of breath. Negative for cough, chest tightness and wheezing.    Cardiovascular:  Negative for chest pain.   Neurological:  Positive for dizziness. Negative for headaches.       When stand up or when standing in line at the grocery store, gets dizzy  Off balance  No ear fullness, cracking,popping or itching    Gets cart when go shopping because get winded  Gets winded when walks from car to office appt  No cp or heart racing  No wheezing  No vision change  Mom-bypass and pacemaker  Had echo 2018 but limited due to pectus  Pt has smoked for 60+ yrs    When walks, hip gives out  Declines ortho and hip injection    Objective   There were no vitals taken for this visit.   BP Readings from Last 3 Encounters:   09/15/23 127/84   04/05/23 138/69   01/06/23 101/60     Wt Readings from Last 3 Encounters:   09/15/23 50.6 kg (111 lb 9.6 oz)   04/05/23 52.1 kg (114 lb 12.8 oz)   01/06/23 51.7 kg (114 lb)       The ASCVD Risk score (Anuradha THOMASON, et al., 2019) failed to calculate for the following reasons:    The 2019 ASCVD risk score is only valid for " ages 40 to 79     Physical Exam  Constitutional:       General: He is not in acute distress.     Appearance: Normal appearance.   Cardiovascular:      Rate and Rhythm: Normal rate. Rhythm irregular.      Heart sounds: Murmur heard.   Pulmonary:      Effort: Pulmonary effort is normal.      Breath sounds: No wheezing, rhonchi or rales.      Comments: Quiet breath sound on the lf  Neurological:      Mental Status: He is alert.         Assessment/Plan   Diagnoses and all orders for this visit:  SOB (shortness of breath) on exertion  -     Disability Placard  -     XR chest 2 views; Future  -     Referral to Pulmonology; Future  -     ECG 12 Lead  -     Referral to Cardiology; Future  Dizziness  -     ECG 12 Lead  -     CT head wo IV contrast; Future

## 2023-10-23 ENCOUNTER — OFFICE VISIT (OUTPATIENT)
Dept: PRIMARY CARE | Facility: CLINIC | Age: 83
End: 2023-10-23
Payer: COMMERCIAL

## 2023-10-23 ENCOUNTER — HOSPITAL ENCOUNTER (OUTPATIENT)
Dept: RADIOLOGY | Facility: EXTERNAL LOCATION | Age: 83
Discharge: HOME | End: 2023-10-23

## 2023-10-23 VITALS
HEART RATE: 66 BPM | TEMPERATURE: 97.1 F | DIASTOLIC BLOOD PRESSURE: 81 MMHG | OXYGEN SATURATION: 97 % | WEIGHT: 110 LBS | SYSTOLIC BLOOD PRESSURE: 131 MMHG | BODY MASS INDEX: 17.68 KG/M2 | HEIGHT: 66 IN

## 2023-10-23 DIAGNOSIS — R42 DIZZINESS: ICD-10-CM

## 2023-10-23 DIAGNOSIS — R06.02 SOB (SHORTNESS OF BREATH) ON EXERTION: Primary | ICD-10-CM

## 2023-10-23 DIAGNOSIS — R06.02 SOB (SHORTNESS OF BREATH) ON EXERTION: ICD-10-CM

## 2023-10-23 PROCEDURE — 3079F DIAST BP 80-89 MM HG: CPT | Performed by: NURSE PRACTITIONER

## 2023-10-23 PROCEDURE — 99214 OFFICE O/P EST MOD 30 MIN: CPT | Performed by: NURSE PRACTITIONER

## 2023-10-23 PROCEDURE — 3075F SYST BP GE 130 - 139MM HG: CPT | Performed by: NURSE PRACTITIONER

## 2023-10-23 PROCEDURE — 1159F MED LIST DOCD IN RCRD: CPT | Performed by: NURSE PRACTITIONER

## 2023-10-23 PROCEDURE — 1126F AMNT PAIN NOTED NONE PRSNT: CPT | Performed by: NURSE PRACTITIONER

## 2023-10-23 PROCEDURE — 93000 ELECTROCARDIOGRAM COMPLETE: CPT | Performed by: NURSE PRACTITIONER

## 2023-10-23 PROCEDURE — 1160F RVW MEDS BY RX/DR IN RCRD: CPT | Performed by: NURSE PRACTITIONER

## 2023-10-23 PROCEDURE — 4004F PT TOBACCO SCREEN RCVD TLK: CPT | Performed by: NURSE PRACTITIONER

## 2023-10-23 RX ORDER — IBUPROFEN 200 MG
TABLET ORAL EVERY 6 HOURS PRN
COMMUNITY

## 2023-10-23 ASSESSMENT — ENCOUNTER SYMPTOMS
COUGH: 0
CHILLS: 0
FEVER: 0
DIZZINESS: 1
CHEST TIGHTNESS: 0
SHORTNESS OF BREATH: 1
HEADACHES: 0

## 2023-10-23 ASSESSMENT — PATIENT HEALTH QUESTIONNAIRE - PHQ9
1. LITTLE INTEREST OR PLEASURE IN DOING THINGS: NOT AT ALL
SUM OF ALL RESPONSES TO PHQ9 QUESTIONS 1 AND 2: 0
2. FEELING DOWN, DEPRESSED OR HOPELESS: NOT AT ALL

## 2023-10-23 NOTE — PATIENT INSTRUCTIONS
Ensure Clear fruit drink    Chest xray  Pulmonologist to do lung function testing and possible inhaler    EKG today  See cardio     Ct head re: dizziness    Handicap placard    Due in march for follow up appt      I will communicate with you via Dogecoin regarding messages and results. If you need help with this, you can call the support line at [phone].    IT WAS A PLEASURE TO SEE YOU TODAY. THANK YOU FOR CHOOSING US FOR YOUR HEALTHCARE NEEDS.

## 2023-10-24 NOTE — PROGRESS NOTES
Patient: Anand Hartman    [id number]  : 1940 -- AGE 83 y.o.    Provider: TIMBO [facility]     Location [facility]   Service Date: 10/23/2023              [facility]  New Visit Note      HISTORY OF PRESENT ILLNESS     The patient's referring provider is: Amanda Barr AP*    HISTORY OF PRESENT ILLNESS   Anand Hartman is a 83 y.o. male who is a current smoker (70 pack years), who presents to a [facility] for an initial evaluation for dyspnea.    I have independently interviewed and examined the patient in the office and reviewed available records.    Current History    On today's visit, the patient reports that he thought he was here for an evaluation for dizziness (has appt with cardiology for this issue next week).  He reports an occasional productive cough.  He denies wheezing, shortness of breath on exertion, shortness of breath at rest, chest pain, and emergency room visits for breathing issues.  He smokes 1 pack of cigarettes daily since the age of 13 years old.  He is declining pharmaceutical therapies to aid in quitting smoking.  He states his respiratory status is stable and has no respiratory issues.    Previous pulmonary history: He has no history of recurrent infections, or lung disease as a child.  He had no previous lung hx, never on oxygen or inhaler therapy.     Inhalers/nebulized medications: none    Hospitalization History: He has not been hospitalized over the last year for breathing related problem.    Sleep history: Denies snoring, apnea, feeling tired during the day or taking naps during the day.       ALLERGIES AND MEDICATIONS     ALLERGIES  Allergies   Allergen Reactions    Codeine Nausea Only and Other     Vomiting       MEDICATIONS  Current Outpatient Medications   Medication Sig Dispense Refill    ibuprofen 200 mg tablet Take by mouth every 6 hours if needed for mild pain (1 - 3).       lisinopril 30 mg tablet Take 1 tablet (30 mg) by mouth once daily. 90 tablet 1     No current facility-administered medications for this visit.         PAST HISTORY     PAST MEDICAL HISTORY    HTN  Peptic ulcer  Nicotine Dependence  PAD    PAST SURGICAL HISTORY  Past Surgical History:   Procedure Laterality Date    MOUTH SURGERY  10/30/2018    Oral Surgery Tooth Extraction    OTHER SURGICAL HISTORY  03/09/2020    Hernia repair       IMMUNIZATION HISTORY  Immunization History   Administered Date(s) Administered    Moderna SARS-CoV-2 Vaccination 03/04/2021, 04/02/2021       SOCIAL HISTORY  Smoking: 13 y/o- current-1 pack/day - 70 pack years   Illicit drug use: no  Drinking alcohol: none    OCCUPATIONAL/ENVIRONMENTAL HISTORY  Retired from [employer].  Known exposure to asbestos.  No exposure to birds or exotic animals.    FAMILY HISTORY  Family History   Problem Relation Name Age of Onset    Other (Cardiac disorder) Mother      Other (Cardiac disorder) Father      Cancer Brother       No family history of pulmonary disease.  No family history of cancer.  No family history of autoimmune disorders.    REVIEW OF SYSTEMS     REVIEW OF SYSTEMS  Review of Systems    Constitutional: No fever, no chills, no night sweats.    Eyes: No double vision, no floaters, no dry eyes.   ENT: See HPI.   Neck: No neck stiffness.  Cardiovascular: No sharp chest pain, no heart racing, no leg swelling.  Respiratory: as noted in HPI.   Gastrointestinal: No nausea, no vomiting, no diarrhea.   Musculoskeletal: No joint pain, no back pain.   Integumentary: No rashes or sores.  Neurological: No dizziness, no headaches. Sleeping well.  Psychiatric: No mood changes.       PHYSICAL EXAM     VITAL SIGNS:   Vitals:    10/26/23 1101   BP: 137/85   Pulse: 56   Temp: 36.4 °C (97.6 °F)   SpO2: 96%            CURRENT WEIGHT: Body mass index is 18.11 kg/m².    PREVIOUS WEIGHTS:  Wt Readings from Last 3 Encounters:   10/23/23 49.9 kg (110 lb)   09/15/23  "50.6 kg (111 lb 9.6 oz)   04/05/23 52.1 kg (114 lb 12.8 oz)       Physical Exam    Constitutional: General appearance: Alert and oriented.  No acute distress. Well developed, well nourished.  Head and face: Symmetric  Pulmonary: Chest is normal. No increased work of breathing or signs of respiratory distress. Clear to auscultation bilaterally - no crackles, wheezing, or rhonchi.   Cardiovascular: Heart rate and rhythm normal. Normal S1, S2 - no murmurs, gallops, or pericardial rub.   Abdomen: Soft, non tender, +BS  Extremities: No edema. No clubbing or cyanosis of the fingernails.    Neurologic: Moves all four extremities   MSK: Normal movements of extremities. Gait normal   Psychiatric: Intact judgement and insight.    RESULTS/DATA     Pulmonary Function Test Results     Pulmonary Functions Testing Results:    None on record    Chest Radiograph     XR chest 2 views 10/23/2023    Narrative  These images are not reportable by radiology and will not be interpreted by  Radiologists.      Chest CT Scan     No results found for this or any previous visit from the past 365 days.      Echocardiogram     No results found for this or any previous visit from the past 365 days.       Labwork   Complete Blood Count  Lab Results   Component Value Date    WBC 6.8 09/26/2023    HGB 14.3 09/26/2023    HCT 46.5 09/26/2023     (H) 09/26/2023     09/26/2023       Peripheral Eosinophil Count/Percentage:   Eosinophils Absolute (x10E9/L)   Date Value   09/26/2023 0.18     Eosinophils % (%)   Date Value   09/26/2023 2.6       Serum Immunoglobulin E:    No results found for: \"IGE\"     ASSESSMENT/PLAN   Mr. Hartman is a 83 y.o. male who is a current smoker (70 pack years), who presents to a [facility] for an initial evaluation for dyspnea.    Patient is not interested in any pharmaceutical therapies to aid in quitting smoking. He is not interested in pulmonary function test or an albuterol " inhaler.  Patient states his respiratory status is stable and has no issues.    Problem List and Orders  Problem List Items Addressed This Visit    None  Visit Diagnoses       SOB (shortness of breath) on exertion    -  Primary    Encounter for tobacco use cessation counseling        Tobacco dependence                Assessment and Plan / Recommendations:  Problem List Items Addressed This Visit    None     Nicotine dependence  Smoking cessation: Patient is currently smoking 1 pack a day  - >5 minutes smoking cessation counseling   - offered pharmacologic options to assist with quitting - declined at this time    I strongly recommend that you quit smoking. I recommend working with our smoking cessation clinic [phone] as this has been shown to help people quit.   You can also call the [place] quits hotline at 0-754-QUIT-NOW.     Follow up as needed      If you have any questions please call the office [phone]    Thank you for visiting the Pulmonary clinic today!   Elin Montoya CNP  [phone]

## 2023-10-26 ENCOUNTER — APPOINTMENT (OUTPATIENT)
Dept: CARDIOLOGY | Facility: CLINIC | Age: 83
End: 2023-10-26
Payer: COMMERCIAL

## 2023-10-26 ENCOUNTER — OFFICE VISIT (OUTPATIENT)
Dept: PULMONOLOGY | Facility: CLINIC | Age: 83
End: 2023-10-26
Payer: COMMERCIAL

## 2023-10-26 VITALS
WEIGHT: 112.2 LBS | TEMPERATURE: 97.6 F | HEART RATE: 56 BPM | BODY MASS INDEX: 18.11 KG/M2 | SYSTOLIC BLOOD PRESSURE: 137 MMHG | OXYGEN SATURATION: 96 % | DIASTOLIC BLOOD PRESSURE: 85 MMHG

## 2023-10-26 DIAGNOSIS — F17.200 TOBACCO DEPENDENCE: ICD-10-CM

## 2023-10-26 DIAGNOSIS — R06.02 SOB (SHORTNESS OF BREATH) ON EXERTION: Primary | ICD-10-CM

## 2023-10-26 DIAGNOSIS — Z71.6 ENCOUNTER FOR TOBACCO USE CESSATION COUNSELING: ICD-10-CM

## 2023-10-26 PROCEDURE — 1126F AMNT PAIN NOTED NONE PRSNT: CPT

## 2023-10-26 PROCEDURE — 99204 OFFICE O/P NEW MOD 45 MIN: CPT

## 2023-10-26 PROCEDURE — 3075F SYST BP GE 130 - 139MM HG: CPT

## 2023-10-26 PROCEDURE — 1159F MED LIST DOCD IN RCRD: CPT

## 2023-10-26 PROCEDURE — 4004F PT TOBACCO SCREEN RCVD TLK: CPT

## 2023-10-26 PROCEDURE — 1160F RVW MEDS BY RX/DR IN RCRD: CPT

## 2023-10-26 PROCEDURE — 99406 BEHAV CHNG SMOKING 3-10 MIN: CPT

## 2023-10-26 PROCEDURE — 3079F DIAST BP 80-89 MM HG: CPT

## 2023-10-30 ENCOUNTER — TELEPHONE (OUTPATIENT)
Dept: PRIMARY CARE | Facility: CLINIC | Age: 83
End: 2023-10-30
Payer: COMMERCIAL

## 2023-10-30 NOTE — TELEPHONE ENCOUNTER
----- Message from TIMBO Gardner-CNP sent at 10/29/2023  9:33 AM EDT -----  Some emphysema noted; no pneumonia, nodules or masses noted

## 2023-11-01 ENCOUNTER — APPOINTMENT (OUTPATIENT)
Dept: CARDIOLOGY | Facility: CLINIC | Age: 83
End: 2023-11-01
Payer: COMMERCIAL

## 2023-11-02 ENCOUNTER — TELEPHONE (OUTPATIENT)
Dept: PRIMARY CARE | Facility: CLINIC | Age: 83
End: 2023-11-02
Payer: COMMERCIAL

## 2023-11-02 DIAGNOSIS — I63.81 LACUNAR INFARCTION (MULTI): Primary | ICD-10-CM

## 2023-11-02 DIAGNOSIS — I73.9 SMALL VESSEL DISEASE (CMS-HCC): ICD-10-CM

## 2023-11-02 NOTE — TELEPHONE ENCOUNTER
----- Message from ASHKAN Gardner sent at 11/2/2023  7:48 AM EDT -----  Ct scan showed old strokes and small vessel disease.  I recommend seeing a neuro dr to further discuss how to prevent new strokes. Pt can call [phone] to schedule this.

## 2023-11-07 ENCOUNTER — OFFICE VISIT (OUTPATIENT)
Dept: CARDIOLOGY | Facility: CLINIC | Age: 83
End: 2023-11-07
Payer: COMMERCIAL

## 2023-11-07 VITALS
HEART RATE: 75 BPM | BODY MASS INDEX: 17.3 KG/M2 | DIASTOLIC BLOOD PRESSURE: 66 MMHG | WEIGHT: 110.2 LBS | OXYGEN SATURATION: 96 % | HEIGHT: 67 IN | SYSTOLIC BLOOD PRESSURE: 102 MMHG

## 2023-11-07 DIAGNOSIS — Z72.0 TOBACCO ABUSE: ICD-10-CM

## 2023-11-07 DIAGNOSIS — R06.02 SOB (SHORTNESS OF BREATH) ON EXERTION: Primary | ICD-10-CM

## 2023-11-07 DIAGNOSIS — I10 PRIMARY HYPERTENSION: ICD-10-CM

## 2023-11-07 PROBLEM — M70.22 OLECRANON BURSITIS OF LEFT ELBOW: Status: ACTIVE | Noted: 2023-11-07

## 2023-11-07 PROBLEM — R79.89 HIGH SERUM CREATININE: Status: ACTIVE | Noted: 2023-04-01

## 2023-11-07 PROBLEM — R06.09 DYSPNEA ON EXERTION: Status: ACTIVE | Noted: 2023-10-23

## 2023-11-07 PROBLEM — R03.0 ELEVATED BLOOD PRESSURE READING WITHOUT DIAGNOSIS OF HYPERTENSION: Status: ACTIVE | Noted: 2023-11-07

## 2023-11-07 PROBLEM — R30.0 DYSURIA: Status: ACTIVE | Noted: 2023-11-07

## 2023-11-07 PROBLEM — R73.09 ELEVATED HEMOGLOBIN A1C: Status: ACTIVE | Noted: 2023-04-01

## 2023-11-07 PROBLEM — N39.0 URINARY TRACT INFECTION: Status: ACTIVE | Noted: 2023-11-07

## 2023-11-07 PROBLEM — R42 LIGHTHEADEDNESS: Status: ACTIVE | Noted: 2022-09-22

## 2023-11-07 PROBLEM — E86.0 ACUTE DEHYDRATION: Status: ACTIVE | Noted: 2022-09-22

## 2023-11-07 PROBLEM — R42 DIZZINESS: Status: ACTIVE | Noted: 2023-11-07

## 2023-11-07 PROBLEM — M25.529 ELBOW PAIN: Status: ACTIVE | Noted: 2023-11-07

## 2023-11-07 PROBLEM — R09.89 ABDOMINAL BRUIT: Status: ACTIVE | Noted: 2023-11-07

## 2023-11-07 PROCEDURE — 1160F RVW MEDS BY RX/DR IN RCRD: CPT | Performed by: NURSE PRACTITIONER

## 2023-11-07 PROCEDURE — 4004F PT TOBACCO SCREEN RCVD TLK: CPT | Performed by: NURSE PRACTITIONER

## 2023-11-07 PROCEDURE — 99204 OFFICE O/P NEW MOD 45 MIN: CPT | Performed by: NURSE PRACTITIONER

## 2023-11-07 PROCEDURE — 1126F AMNT PAIN NOTED NONE PRSNT: CPT | Performed by: NURSE PRACTITIONER

## 2023-11-07 PROCEDURE — 1159F MED LIST DOCD IN RCRD: CPT | Performed by: NURSE PRACTITIONER

## 2023-11-07 PROCEDURE — 3078F DIAST BP <80 MM HG: CPT | Performed by: NURSE PRACTITIONER

## 2023-11-07 PROCEDURE — 3074F SYST BP LT 130 MM HG: CPT | Performed by: NURSE PRACTITIONER

## 2023-11-07 ASSESSMENT — PAIN SCALES - GENERAL: PAINLEVEL: 0-NO PAIN

## 2023-11-07 NOTE — PATIENT INSTRUCTIONS
- Echocardiogram (ultrasound of your heart) to assess the function as well as for any valve abnormalities  - Coronary Calcium Scan:  a specialized X-ray test that provides pictures of your heart that can help your Provider detect and measure calcium-containing plaque in your arteries. Plaque inside the arteries of your heart can grow and restrict blood flow to the muscles of your heart. Measuring calcified plaque with a heart scan may allow your Provider to identify possible coronary artery disease before you have signs and symptoms.     It was my pleasure to meet you. I look forward to being your cardiac Nurse Practitioner. I am a huge believer in communicating with my patients. Please contact me at any time, if anything is not clear to you regarding anything we have discussed, or if new questions occur to you.

## 2023-11-07 NOTE — PROGRESS NOTES
"Name : Anand Hartman   : 1940   MRN : [id number]   ENC Date : 2023    CC: \" I don't know, I'm here for a check up\"     HPI:    Anand Hartman is a 83 y.o. male with PMHx sig for HTN & 70 pack year smoker who presents today for CV evaluation.    On review of CNP Twombly's notes, Mr Hartman has c/o some SOB for which she ordered pulm & cv evaluation. Reports that he gets a little winded sometimes, but is doing out. No routine exercise at home. Plan for the day today was to go home, eat breakfast (its 1230) & do nothing. Denies any chest pain, pressure, PND, orthopnea, LE edema, palpitations, lightheadedness, or syncope.      PMH:  As above    PSH:  He has a past surgical history that includes Mouth surgery (10/30/2018) and Other surgical history (2020).      SHx:  Tobacco- 70 pack year smoker  ETOH- none  Drugs- none  Work- [employer]    FHx:  Family History   Problem Relation Name Age of Onset    Other (Cardiac disorder) Mother      Other (Cardiac disorder) Father      Cancer Brother          Allergies:  Codeine    Outpatient Medications:  Current Outpatient Medications   Medication Instructions    ibuprofen 200 mg tablet oral, Every 6 hours PRN    lisinopril 30 mg, oral, Daily     Last Recorded Vitals:  Vitals:    23 1232   BP: 102/66   BP Location: Right arm   Patient Position: Sitting   BP Cuff Size: Adult   Pulse: 75   SpO2: 96%   Weight: 50 kg (110 lb 3.2 oz)   Height: 1.702 m (5' 7\")     Physical Exam:  On exam Mr. Hartman appears his stated age, is alert and oriented x3, and in no acute distress. Cachetic. His sclera are anicteric and his oropharynx has moist mucous membranes. His neck is supple and without thyromegaly. The JVP is ~5 cm of water above the right atrium. Notable pectus excavatum. His cardiac exam has regular rhythm, normal S1, S2. No S3/4. There are no murmurs. His lungs are clear to auscultation bilaterally and there is no dullness to percussion. His abdomen is soft, " nontender with normoactive bowel sounds. There is no HJR. The extremities are warm and without edema. The skin is dry. There is no rash present. The distal pulses are 2-3+ in all four extremities. His mood and affect are appropriate for todays encounter.      Last Labs:  CBC -  Lab Results   Component Value Date    WBC 6.8 09/26/2023    HGB 14.3 09/26/2023    HCT 46.5 09/26/2023     (H) 09/26/2023     09/26/2023       CMP -  Lab Results   Component Value Date    CALCIUM 9.6 09/26/2023    PROT 6.9 09/26/2023    ALBUMIN 4.3 09/26/2023    AST 17 09/26/2023    ALT 10 09/26/2023    ALKPHOS 97 09/26/2023    BILITOT 0.5 09/26/2023       LIPID PANEL -   Lab Results   Component Value Date    CHOL 176 09/26/2023    TRIG 120 09/26/2023    HDL 48.2 09/26/2023    CHHDL 3.7 09/26/2023    LDLF 104 (H) 09/26/2023    VLDL 24 09/26/2023       RENAL FUNCTION PANEL -   Lab Results   Component Value Date    GLUCOSE 103 (H) 09/26/2023     09/26/2023    K 4.6 09/26/2023     09/26/2023    CO2 19 (L) 09/26/2023    ANIONGAP 18 09/26/2023    BUN 28 (H) 09/26/2023    CREATININE 1.39 (H) 09/26/2023    GFRMALE 50 (A) 09/26/2023    CALCIUM 9.6 09/26/2023    ALBUMIN 4.3 09/26/2023        Lab Results   Component Value Date    HGBA1C 5.9 04/05/2023     Last Cardiology Tests:  ECG:  Sinus Arrhythmia    I have reviewed the above labs & diagnostics    Assessment/Plan:  SOB  Tobacco Abuse  HTN    Given his comorbid conditions, multiple CV risk factors, further evaluation is warranted for symptoms. Recommend Echocardiogram & CT Cardiac Score test.    Follow up after testing    Tracy M Schwab, APRN-CNP

## 2023-11-19 ENCOUNTER — APPOINTMENT (OUTPATIENT)
Dept: RADIOLOGY | Facility: HOSPITAL | Age: 83
End: 2023-11-19
Payer: COMMERCIAL

## 2023-11-20 DIAGNOSIS — I10 PRIMARY HYPERTENSION: ICD-10-CM

## 2023-11-20 RX ORDER — LISINOPRIL 30 MG/1
30 TABLET ORAL DAILY
Qty: 90 TABLET | Refills: 0 | Status: SHIPPED | OUTPATIENT
Start: 2023-11-20 | End: 2024-04-22

## 2024-01-10 ENCOUNTER — APPOINTMENT (OUTPATIENT)
Dept: CARDIOLOGY | Facility: CLINIC | Age: 84
End: 2024-01-10
Payer: COMMERCIAL

## 2024-03-14 PROBLEM — R42 LIGHTHEADEDNESS: Status: RESOLVED | Noted: 2022-09-22 | Resolved: 2024-03-14

## 2024-03-14 PROBLEM — M70.22 OLECRANON BURSITIS OF LEFT ELBOW: Status: RESOLVED | Noted: 2023-11-07 | Resolved: 2024-03-14

## 2024-03-14 PROBLEM — M25.529 ELBOW PAIN: Status: RESOLVED | Noted: 2023-11-07 | Resolved: 2024-03-14

## 2024-03-14 PROBLEM — R42 DIZZINESS: Status: RESOLVED | Noted: 2023-11-07 | Resolved: 2024-03-14

## 2024-03-14 PROBLEM — E86.0 ACUTE DEHYDRATION: Status: RESOLVED | Noted: 2022-09-22 | Resolved: 2024-03-14

## 2024-03-14 PROBLEM — N39.0 URINARY TRACT INFECTION: Status: RESOLVED | Noted: 2023-11-07 | Resolved: 2024-03-14

## 2024-03-14 PROBLEM — R30.0 DYSURIA: Status: RESOLVED | Noted: 2023-11-07 | Resolved: 2024-03-14

## 2024-04-22 ENCOUNTER — TELEPHONE (OUTPATIENT)
Dept: PRIMARY CARE | Facility: CLINIC | Age: 84
End: 2024-04-22
Payer: COMMERCIAL

## 2024-04-22 DIAGNOSIS — I10 PRIMARY HYPERTENSION: ICD-10-CM

## 2024-04-22 RX ORDER — LISINOPRIL 30 MG/1
30 TABLET ORAL DAILY
Qty: 30 TABLET | Refills: 0 | Status: SHIPPED | OUTPATIENT
Start: 2024-04-22 | End: 2024-05-23

## 2024-05-22 DIAGNOSIS — I10 PRIMARY HYPERTENSION: ICD-10-CM

## 2024-05-22 PROBLEM — R73.09 ELEVATED HEMOGLOBIN A1C: Status: RESOLVED | Noted: 2023-04-01 | Resolved: 2024-05-22

## 2024-05-23 RX ORDER — LISINOPRIL 30 MG/1
30 TABLET ORAL DAILY
Qty: 90 TABLET | Refills: 2 | Status: SHIPPED | OUTPATIENT
Start: 2024-05-23

## 2024-05-28 ENCOUNTER — TELEPHONE (OUTPATIENT)
Dept: PRIMARY CARE | Facility: CLINIC | Age: 84
End: 2024-05-28
Payer: COMMERCIAL

## 2024-05-28 DIAGNOSIS — M79.605 LEFT LEG PAIN: Primary | ICD-10-CM

## 2024-05-28 DIAGNOSIS — I73.9 CLAUDICATION OF LEFT LOWER EXTREMITY (CMS-HCC): ICD-10-CM

## 2024-05-28 NOTE — TELEPHONE ENCOUNTER
Patient was seen in ED yesterday for leg numbness.  Was advised to see Vascular surgery, but can not get in until August.  He was advised to have ultrasound done before.  He is requesting to see you for this.  Did you want to see him or can you place the order than follow up?      Please advise.

## 2024-05-29 PROBLEM — R03.0 ELEVATED BLOOD PRESSURE READING WITHOUT DIAGNOSIS OF HYPERTENSION: Status: RESOLVED | Noted: 2023-11-07 | Resolved: 2024-05-29

## 2024-05-29 ASSESSMENT — ENCOUNTER SYMPTOMS
SHORTNESS OF BREATH: 0
CONSTITUTIONAL NEGATIVE: 1
WHEEZING: 0

## 2024-05-29 NOTE — PROGRESS NOTES
Subjective   Patient ID: Anand Hartman is a 84 y.o. male who presents for Hospital Follow-up.  Last physical:9/15/23    ACP  Is pt fasting?  no   Did pt see neuro dr re: old strokes? No , delcines  Did pt see pulmonologist re: sob? No declines  Does pt want to discuss quitting smoking? No   Does pt want a pneumonia shot? No   BPs at home (put an avg of the numbers)- 130/80  Went to ER 5/27/24;  current sx-leg falls asleep   Did they give him a referral to a vascular dr? Yes   If yes, did he set up an appt? Yes   Any other questions or concerns that pt wants to discuss today? Needs ultrasound order.     Poc A1c=5.8        HPI  Last labs-5/2024 at ER: potassium high, sugar 117, hdl 38, gfr 48, troponin neg, pt inr nl, hgb sl low  9/2023 Hdl and trigs normal.   Ldl just slightly high at 104. Goal <100. Decr fats and incr fiber.    liver function and electrolytes in the CMP (comprehensive metabolic panel) were normal.   Sugar high at 103. Goal <100. Decr carbs and sugars. Pt has known prediabetes.   Kidney function stable. Continue to increase fluids and decrease ibuprofen, motrin, advil, naproxen, aleve and other anti-inflammatories. Eat a low sodium balanced diet. Exercise regularly. Keep your blood pressure <130/<80. Keep sugars to goal.  Taking 2 ibuprofen a day.  CBC (complete blood count) was normal which looks at infection and anemia markers.   PSA (prostate blood test) is normal.   Due for labs- bmp    Cholesterol   Date Value Ref Range Status   09/26/2023 176 0 - 199 mg/dL Final     Comment:     .      AGE      DESIRABLE   BORDERLINE HIGH   HIGH     0-19 Y     0 - 169       170 - 199     >/= 200    20-24 Y     0 - 189       190 - 224     >/= 225         >24 Y     0 - 199       200 - 239     >/= 240   **All ranges are based on fasting samples. Specific   therapeutic targets will vary based on patient-specific   cardiac risk.  .   Pediatric guidelines reference:Pediatrics 2011, 128(S5).   Adult guidelines  reference: NCEP ATPIII Guidelines,     COLLETTE 2001, 258:2486-97  .   Venipuncture immediately after or during the    administration of Metamizole may lead to falsely   low results. Testing should be performed immediately   prior to Metamizole dosing.   08/27/2021 157 0 - 199 mg/dL Final     Comment:     .      AGE      DESIRABLE   BORDERLINE HIGH   HIGH     0-19 Y     0 - 169       170 - 199     >/= 200    20-24 Y     0 - 189       190 - 224     >/= 225         >24 Y     0 - 199       200 - 239     >/= 240   **All ranges are based on fasting samples. Specific   therapeutic targets will vary based on patient-specific   cardiac risk.  .   Pediatric guidelines reference:Pediatrics 2011, 128(S5).   Adult guidelines reference: NCEP ATPIII Guidelines,     COLLETTE 2001, 258:2486-97  .   Venipuncture immediately after or during the    administration of Metamizole may lead to falsely   low results. Testing should be performed immediately   prior to Metamizole dosing.     11/25/2020 110 0 - 199 mg/dL Final     Comment:     .      AGE      DESIRABLE   BORDERLINE HIGH   HIGH     0-19 Y     0 - 169       170 - 199     >/= 200    20-24 Y     0 - 189       190 - 224     >/= 225         >24 Y     0 - 199       200 - 239     >/= 240   **All ranges are based on fasting samples. Specific   therapeutic targets will vary based on patient-specific   cardiac risk.  .   Pediatric guidelines reference:Pediatrics 2011, 128(S5).   Adult guidelines reference: NCEP ATPIII Guidelines,     COLLETTE 2001, 258:2486-97  .   Venipuncture immediately after or during the    administration of Metamizole may lead to falsely   low results. Testing should be performed immediately   prior to Metamizole dosing.       Triglycerides   Date Value Ref Range Status   09/26/2023 120 0 - 149 mg/dL Final     Comment:     .      AGE      DESIRABLE   BORDERLINE HIGH   HIGH     VERY HIGH   0 D-90 D    19 - 174         ----         ----        ----  91 D- 9 Y     0 -  74         75 -  99     >/= 100      ----    10-19 Y     0 -  89        90 - 129     >/= 130      ----    20-24 Y     0 - 114       115 - 149     >/= 150      ----         >24 Y     0 - 149       150 - 199    200- 499    >/= 500  .   Venipuncture immediately after or during the    administration of Metamizole may lead to falsely   low results. Testing should be performed immediately   prior to Metamizole dosing.   08/27/2021 103 0 - 149 mg/dL Final     Comment:     .      AGE      DESIRABLE   BORDERLINE HIGH   HIGH     VERY HIGH   0 D-90 D    19 - 174         ----         ----        ----  91 D- 9 Y     0 -  74        75 -  99     >/= 100      ----    10-19 Y     0 -  89        90 - 129     >/= 130      ----    20-24 Y     0 - 114       115 - 149     >/= 150      ----         >24 Y     0 - 149       150 - 199    200- 499    >/= 500  .   Venipuncture immediately after or during the    administration of Metamizole may lead to falsely   low results. Testing should be performed immediately   prior to Metamizole dosing.     11/25/2020 70 0 - 149 mg/dL Final     Comment:     .      AGE      DESIRABLE   BORDERLINE HIGH   HIGH     VERY HIGH   0 D-90 D    19 - 174         ----         ----        ----  91 D- 9 Y     0 -  74        75 -  99     >/= 100      ----    10-19 Y     0 -  89        90 - 129     >/= 130      ----    20-24 Y     0 - 114       115 - 149     >/= 150      ----         >24 Y     0 - 149       150 - 199    200- 499    >/= 500  .   Venipuncture immediately after or during the    administration of Metamizole may lead to falsely   low results. Testing should be performed immediately   prior to Metamizole dosing.       HDL   Date Value Ref Range Status   09/26/2023 48.2 mg/dL Final     Comment:     .      AGE      VERY LOW   LOW     NORMAL    HIGH       0-19 Y       < 35   < 40     40-45     ----    20-24 Y       ----   < 40       >45     ----      >24 Y       ----   < 40     40-60      >60  .   08/27/2021 40.4 mg/dL  "Final     Comment:     .      AGE      VERY LOW   LOW     NORMAL    HIGH       0-19 Y       < 35   < 40     40-45     ----    20-24 Y       ----   < 40       >45     ----      >24 Y       ----   < 40     40-60      >60  .     11/25/2020 41.6 mg/dL Final     Comment:     .      AGE      VERY LOW   LOW     NORMAL    HIGH       0-19 Y       < 35   < 40     40-45     ----    20-24 Y       ----   < 40       >45     ----      >24 Y       ----   < 40     40-60      >60  .       No results found for: \"LDL\"  TSH   Date Value Ref Range Status   08/27/2021 1.89 0.44 - 3.98 mIU/L Final     Comment:      TSH testing is performed using different testing    methodology at [facility] than at other    [facility]. Direct result comparisons should    only be made within the same method.       No results found for: \"A1C\"  No components found for: \"POCA1C\"  No results found for: \"ALBUR\"  No components found for: \"POCALBUR\"  Went to ER 5/27/24 lf lower leg numbness and tingling and cramping  Labs-potassium high, sugar 117, hdl 38, gfr 48, troponin neg, pt inr nl, hgb sl low  Ct head neg  Cxr-copd  Dx PAD-leg pain  Referred to vascular dr-has appt in aug-diego jeong    Legs fall asleep x1mon-knee to foot; calf cramping-radiates to hip  No fall or injury  No back pain  Owlenp-uuexn-9s coffee and sprite  Selftxt: massage, compression socks  Xray hip 9/2023-arthritis    Recheck kidney function and potassium    Wt 108#  Not drinking Ensure clear      Immunization History   Administered Date(s) Administered    Moderna SARS-CoV-2 Vaccination 03/04/2021, 04/02/2021        No care team member to display     Review of Systems   Constitutional: Negative.    Respiratory:  Negative for shortness of breath and wheezing.    Cardiovascular:  Negative for chest pain.   Musculoskeletal:         Lf leg numbness       Objective   Visit Vitals  /72   Pulse 84   Temp 36.2 °C (97.1 °F)      BP Readings from Last 3 Encounters: "   05/30/24 126/72   11/07/23 102/66   10/26/23 137/85     Wt Readings from Last 3 Encounters:   05/30/24 49.1 kg (108 lb 3.2 oz)   11/07/23 50 kg (110 lb 3.2 oz)   10/26/23 50.9 kg (112 lb 3.2 oz)       The ASCVD Risk score (Anuradha THOMASON, et al., 2019) failed to calculate for the following reasons:    The 2019 ASCVD risk score is only valid for ages 40 to 79     Physical Exam  Constitutional:       General: He is not in acute distress.     Appearance: Normal appearance.   Musculoskeletal:      Comments: Left calf with no redness rash or swelling.     Neurological:      Mental Status: He is alert.       Assessment/Plan   Diagnoses and all orders for this visit:  PAD (peripheral artery disease) (CMS-[facility])  Hyperkalemia  -     Basic metabolic panel; Future  Function kidney decreased  -     Basic metabolic panel; Future  Prediabetes  -     POCT glycosylated hemoglobin (Hb A1C) manually resulted  Primary hypertension  Numbness and tingling of left leg  -     EMG & nerve conduction; Future  Pain of left lower extremity  -     Lower extremity venous duplex left; Future

## 2024-05-30 ENCOUNTER — OFFICE VISIT (OUTPATIENT)
Dept: PRIMARY CARE | Facility: CLINIC | Age: 84
End: 2024-05-30
Payer: COMMERCIAL

## 2024-05-30 VITALS
BODY MASS INDEX: 16.98 KG/M2 | TEMPERATURE: 97.1 F | OXYGEN SATURATION: 99 % | HEIGHT: 67 IN | WEIGHT: 108.2 LBS | SYSTOLIC BLOOD PRESSURE: 126 MMHG | HEART RATE: 84 BPM | DIASTOLIC BLOOD PRESSURE: 72 MMHG

## 2024-05-30 DIAGNOSIS — N28.9 FUNCTION KIDNEY DECREASED: ICD-10-CM

## 2024-05-30 DIAGNOSIS — M79.605 PAIN OF LEFT LOWER EXTREMITY: ICD-10-CM

## 2024-05-30 DIAGNOSIS — R20.0 NUMBNESS AND TINGLING OF LEFT LEG: ICD-10-CM

## 2024-05-30 DIAGNOSIS — I10 PRIMARY HYPERTENSION: ICD-10-CM

## 2024-05-30 DIAGNOSIS — R20.2 NUMBNESS AND TINGLING OF LEFT LEG: ICD-10-CM

## 2024-05-30 DIAGNOSIS — R73.03 PREDIABETES: ICD-10-CM

## 2024-05-30 DIAGNOSIS — E87.5 HYPERKALEMIA: ICD-10-CM

## 2024-05-30 DIAGNOSIS — I73.9 PAD (PERIPHERAL ARTERY DISEASE) (CMS-HCC): Primary | ICD-10-CM

## 2024-05-30 LAB — POC HEMOGLOBIN A1C: 5.8 % (ref 4.2–6.5)

## 2024-05-30 PROCEDURE — 1160F RVW MEDS BY RX/DR IN RCRD: CPT | Performed by: NURSE PRACTITIONER

## 2024-05-30 PROCEDURE — 1159F MED LIST DOCD IN RCRD: CPT | Performed by: NURSE PRACTITIONER

## 2024-05-30 PROCEDURE — 99214 OFFICE O/P EST MOD 30 MIN: CPT | Performed by: NURSE PRACTITIONER

## 2024-05-30 PROCEDURE — 3078F DIAST BP <80 MM HG: CPT | Performed by: NURSE PRACTITIONER

## 2024-05-30 PROCEDURE — 1123F ACP DISCUSS/DSCN MKR DOCD: CPT | Performed by: NURSE PRACTITIONER

## 2024-05-30 PROCEDURE — 83036 HEMOGLOBIN GLYCOSYLATED A1C: CPT | Performed by: NURSE PRACTITIONER

## 2024-05-30 PROCEDURE — 1158F ADVNC CARE PLAN TLK DOCD: CPT | Performed by: NURSE PRACTITIONER

## 2024-05-30 PROCEDURE — 3074F SYST BP LT 130 MM HG: CPT | Performed by: NURSE PRACTITIONER

## 2024-05-30 ASSESSMENT — PATIENT HEALTH QUESTIONNAIRE - PHQ9
SUM OF ALL RESPONSES TO PHQ9 QUESTIONS 1 AND 2: 1
2. FEELING DOWN, DEPRESSED OR HOPELESS: SEVERAL DAYS
1. LITTLE INTEREST OR PLEASURE IN DOING THINGS: NOT AT ALL
10. IF YOU CHECKED OFF ANY PROBLEMS, HOW DIFFICULT HAVE THESE PROBLEMS MADE IT FOR YOU TO DO YOUR WORK, TAKE CARE OF THINGS AT HOME, OR GET ALONG WITH OTHER PEOPLE: NOT DIFFICULT AT ALL

## 2024-05-30 NOTE — TELEPHONE ENCOUNTER
I'm going to order pvr lower extremity. Pt can call [phone] to schedule this.  If they get a call from , ignore it.

## 2024-05-30 NOTE — TELEPHONE ENCOUNTER
Eveline from vascular surgery called to get an order for PVR complete, he was diagnosed with PAD. He has claudication of calf muscles, pain in left leg. He has an appt on 8/14 with [facility]. Please call pt to let him know when order is in.

## 2024-05-30 NOTE — TELEPHONE ENCOUNTER
Pls call vascular dept whoever does the testing to see what pvr complete.   I have orders for pvr with exercise, pvr without exercise

## 2024-05-30 NOTE — PATIENT INSTRUCTIONS
Ensure clear  Grazing-little all day    A1C today=5.8  This is the 3 month average of your sugars.  You are in the  prediabetes range 5.7-6.4    Nerve conduction test  Ultrasound leg for clots  Lab  Incr fluids  Magnesium otc 400mg 1 at bed  Alpha lipoic acid otc 600mg 1 a day    Return in sept for wellness      I will communicate with you via [employer] regarding messages and results. If you need help with this, you can call the support line at [phone].    IT WAS A PLEASURE TO SEE YOU TODAY. THANK YOU FOR CHOOSING US FOR YOUR HEALTHCARE NEEDS.

## 2024-06-04 ENCOUNTER — TELEPHONE (OUTPATIENT)
Dept: PRIMARY CARE | Facility: CLINIC | Age: 84
End: 2024-06-04
Payer: COMMERCIAL

## 2024-06-04 DIAGNOSIS — I73.9 BILATERAL CLAUDICATION OF LOWER LIMB (CMS-HCC): ICD-10-CM

## 2024-06-04 DIAGNOSIS — M79.604 BILATERAL LEG PAIN: Primary | ICD-10-CM

## 2024-06-04 DIAGNOSIS — M79.605 BILATERAL LEG PAIN: Primary | ICD-10-CM

## 2024-06-04 NOTE — TELEPHONE ENCOUNTER
Pt already has an US order in his chart for his left leg- he is now requesting an US order be put in for his right leg as well. He states that he is experiencing numbness from his knees down to his toes.

## 2024-06-14 ENCOUNTER — TELEPHONE (OUTPATIENT)
Dept: PRIMARY CARE | Facility: CLINIC | Age: 84
End: 2024-06-14
Payer: COMMERCIAL

## 2024-06-14 DIAGNOSIS — R20.0 BILATERAL LEG NUMBNESS: Primary | ICD-10-CM

## 2024-06-14 LAB
NON-UH HIE BUN/CREAT RATIO: 20
NON-UH HIE BUN: 28 MG/DL (ref 9–23)
NON-UH HIE CALCIUM: 9.3 MG/DL (ref 8.7–10.4)
NON-UH HIE CALCULATED OSMOLALITY: 283 MOSM/KG (ref 275–295)
NON-UH HIE CHLORIDE: 105 MMOL/L (ref 98–107)
NON-UH HIE CO2, VENOUS: 27 MMOL/L (ref 20–31)
NON-UH HIE CREATININE: 1.4 MG/DL (ref 0.6–1.1)
NON-UH HIE GFR AA: 58
NON-UH HIE GLOMERULAR FILTRATION RATE: 48 ML/MIN/1.73M?
NON-UH HIE GLUCOSE: 122 MG/DL (ref 74–106)
NON-UH HIE K: 5.3 MMOL/L (ref 3.5–5.1)
NON-UH HIE NA: 138 MMOL/L (ref 135–145)

## 2024-06-14 NOTE — TELEPHONE ENCOUNTER
Spoke with patient. He was referring to the EMG, not the duplex.  He wants to make sure they do both legs for the EMG.  He is scheduled for the EMG to be done in [place].

## 2024-06-14 NOTE — TELEPHONE ENCOUNTER
Pt has Lower extremity venous duplex left scheduled, but has requested to get an order for Lower extremity venous duplex right put in. Please advise.

## 2024-06-16 DIAGNOSIS — N28.9 DECREASED RENAL FUNCTION: ICD-10-CM

## 2024-06-16 DIAGNOSIS — E87.5 HYPERKALEMIA: Primary | ICD-10-CM

## 2024-06-17 ENCOUNTER — TELEPHONE (OUTPATIENT)
Dept: PRIMARY CARE | Facility: CLINIC | Age: 84
End: 2024-06-17
Payer: COMMERCIAL

## 2024-06-17 NOTE — TELEPHONE ENCOUNTER
----- Message from ASHKAN Gardner sent at 6/16/2024 10:50 AM EDT -----  Potassium is a little high.  Stop any multivitamins or any supplements with potassium in it.  Kidney function is lower than last time. Increase fluids. Decrease ibuprofen, motrin, advil, naproxen, aleve and other anti-inflammatories. Eat a low sodium balanced diet. Exercise regularly. Keep your blood pressure <130/<80. Keep sugars to goal. Decrease alcohol and stop smoking if applicable.   Recheck lab in 2wks for potassium and kidney function. No fasting needed.

## 2024-07-19 ENCOUNTER — OFFICE VISIT (OUTPATIENT)
Dept: PRIMARY CARE | Facility: CLINIC | Age: 84
End: 2024-07-19
Payer: COMMERCIAL

## 2024-07-19 VITALS
HEART RATE: 102 BPM | SYSTOLIC BLOOD PRESSURE: 89 MMHG | OXYGEN SATURATION: 93 % | DIASTOLIC BLOOD PRESSURE: 54 MMHG | TEMPERATURE: 97.8 F | WEIGHT: 107.2 LBS | HEIGHT: 67 IN | BODY MASS INDEX: 16.83 KG/M2

## 2024-07-19 DIAGNOSIS — E46 PROTEIN-CALORIE MALNUTRITION, UNSPECIFIED SEVERITY (MULTI): ICD-10-CM

## 2024-07-19 DIAGNOSIS — I80.01 THROMBOPHLEBITIS OF SUPERFICIAL VEINS OF RIGHT LOWER EXTREMITY: Primary | ICD-10-CM

## 2024-07-19 DIAGNOSIS — R63.4 ABNORMAL WEIGHT LOSS: ICD-10-CM

## 2024-07-19 PROCEDURE — 1159F MED LIST DOCD IN RCRD: CPT | Performed by: NURSE PRACTITIONER

## 2024-07-19 PROCEDURE — 3074F SYST BP LT 130 MM HG: CPT | Performed by: NURSE PRACTITIONER

## 2024-07-19 PROCEDURE — 1123F ACP DISCUSS/DSCN MKR DOCD: CPT | Performed by: NURSE PRACTITIONER

## 2024-07-19 PROCEDURE — 99214 OFFICE O/P EST MOD 30 MIN: CPT | Performed by: NURSE PRACTITIONER

## 2024-07-19 PROCEDURE — 1158F ADVNC CARE PLAN TLK DOCD: CPT | Performed by: NURSE PRACTITIONER

## 2024-07-19 PROCEDURE — 3078F DIAST BP <80 MM HG: CPT | Performed by: NURSE PRACTITIONER

## 2024-07-19 ASSESSMENT — ENCOUNTER SYMPTOMS
FEVER: 0
CHILLS: 0
ROS GI COMMENTS: FREQUENT STOOLS
SHORTNESS OF BREATH: 0
WHEEZING: 0
UNEXPECTED WEIGHT CHANGE: 1

## 2024-07-19 ASSESSMENT — PATIENT HEALTH QUESTIONNAIRE - PHQ9
1. LITTLE INTEREST OR PLEASURE IN DOING THINGS: NOT AT ALL
2. FEELING DOWN, DEPRESSED OR HOPELESS: NOT AT ALL
SUM OF ALL RESPONSES TO PHQ9 QUESTIONS 1 AND 2: 0

## 2024-07-19 NOTE — PATIENT INSTRUCTIONS
Warm compresses  Ace wrap  Ibuprofen 200mg 2 three times a day with food x 2d  We will call you Monday to see if still painful and swollen--->prednisone and antibiotic  Call if pain worsens or redness of arm or fever/chills    Pill box  Don't double up on bp med    2 ensure  See gi dr    Look at feet daily  Wear shoes or slippers at all times.    Keep sept appt      I will communicate with you via [employer] regarding messages and results. If you need help with this, you can call the support line at [phone].    IT WAS A PLEASURE TO SEE YOU TODAY. THANK YOU FOR CHOOSING US FOR YOUR HEALTHCARE NEEDS.

## 2024-07-19 NOTE — PROGRESS NOTES
Subjective   Patient ID: Anand Hartman is a 84 y.o. male who presents for No chief complaint on file..  Last physical: awv scheduled for sept 5/2024 A1c 5.8, Potassium is a little high.  Stop any multivitamins or any supplements with potassium in it.  Kidney function is lower than last time. Increase fluids. Decrease ibuprofen, motrin, advil, naproxen, aleve and other anti-inflammatories. Eat a low sodium balanced diet. Exercise regularly. Keep your blood pressure <130/<80. Keep sugars to goal. Decrease alcohol and stop smoking if applicable.  Recheck lab in 2wks for potassium and kidney function. No fasting needed.  Due for lab    ACP  When did sx start?  Wednesday   What sx is he having? Pain in vein on right arm from elbow up   When did he have an IV? Wednesday for CT scan   Does pt want to discuss quitting smoking? No     Any other questions or concerns that pt wants to discuss today? No       Dtr here with pt    HPI  Got IV for a ct scan 2d ago  Pain in vein on rt arm where IV went since this am; vein is swollen  Sl red  No fever or chills  No rash  Selftxt: none    Stool after eat  No abd pain  No bld in stool    Lost 1lb  Drinking 1 ensure a day  Pt states he eats 3 meals a day    Forgets sometimes if he takes his bp med    Has neuropathy  Surgeon will not do surgery because too high risk for his heart      No care team member to display     Review of Systems   Constitutional:  Positive for unexpected weight change. Negative for chills and fever.   Respiratory:  Negative for shortness of breath and wheezing.    Cardiovascular:  Negative for chest pain.   Gastrointestinal:         Frequent stools   Skin:         Swollen vein rt arm   Neurological:         Bilat foot neuropathy         Objective   Visit Vitals  BP 89/54   Pulse 102   Temp 36.6 °C (97.8 °F)      BP Readings from Last 3 Encounters:   07/19/24 89/54   05/30/24 126/72   11/07/23 102/66     Wt Readings from Last 3 Encounters:   07/19/24 48.6 kg  (107 lb 3.2 oz)   05/30/24 49.1 kg (108 lb 3.2 oz)   11/07/23 50 kg (110 lb 3.2 oz)       Physical Exam  Constitutional:       General: He is not in acute distress.     Appearance: Normal appearance.   Skin:     Comments: Rt arm-upper forearm to above elbow with swollen vein noted. Tender to touch. Sl red at the end above the elbow. No redness increasing up the arm. No open area or discharge noted. Radial pulse 2+. Nl rt arm and hand strength.   Neurological:      Mental Status: He is alert.       Assessment/Plan   Diagnoses and all orders for this visit:  Thrombophlebitis of superficial veins of right lower extremity  Protein-calorie malnutrition, unspecified severity (Multi)  -     Referral to Gastroenterology; Future  Abnormal weight loss  -     Referral to Gastroenterology; Future

## 2024-07-22 ENCOUNTER — TELEPHONE (OUTPATIENT)
Dept: PRIMARY CARE | Facility: CLINIC | Age: 84
End: 2024-07-22
Payer: COMMERCIAL

## 2024-07-22 NOTE — LETTER
July 25, 2024     Anand Hartman  [address] [facility] [address]    Patient: Anand Hartman   YOB: 1940   Date of Visit: 7/22/2024         Dear Anand,    We have made several attempts to contact you by phone regarding your arm symptoms.   Please call to our office at [phone] option 1 so that we can make sure you are getting better.          Sincerely,          Amanda Barr, APRN-CNP

## 2024-09-09 ENCOUNTER — APPOINTMENT (OUTPATIENT)
Dept: PRIMARY CARE | Facility: CLINIC | Age: 84
End: 2024-09-09
Payer: COMMERCIAL

## 2024-11-15 ENCOUNTER — APPOINTMENT (OUTPATIENT)
Dept: PRIMARY CARE | Facility: CLINIC | Age: 84
End: 2024-11-15
Payer: COMMERCIAL

## 2025-01-17 ENCOUNTER — TELEPHONE (OUTPATIENT)
Dept: PRIMARY CARE | Facility: CLINIC | Age: 85
End: 2025-01-17
Payer: COMMERCIAL

## 2025-01-17 DIAGNOSIS — I10 PRIMARY HYPERTENSION: ICD-10-CM

## 2025-01-17 RX ORDER — LISINOPRIL 30 MG/1
30 TABLET ORAL DAILY
Qty: 30 TABLET | Refills: 0 | Status: SHIPPED | OUTPATIENT
Start: 2025-01-17

## 2025-02-10 DIAGNOSIS — I10 PRIMARY HYPERTENSION: ICD-10-CM

## 2025-02-10 RX ORDER — LISINOPRIL 30 MG/1
30 TABLET ORAL DAILY
Qty: 90 TABLET | Refills: 2 | Status: SHIPPED | OUTPATIENT
Start: 2025-02-10 | End: 2025-02-13 | Stop reason: SDUPTHER

## 2025-02-13 DIAGNOSIS — I10 PRIMARY HYPERTENSION: ICD-10-CM

## 2025-02-13 RX ORDER — LISINOPRIL 30 MG/1
30 TABLET ORAL DAILY
Qty: 90 TABLET | Refills: 2 | Status: SHIPPED | OUTPATIENT
Start: 2025-02-13

## 2025-03-10 ASSESSMENT — ENCOUNTER SYMPTOMS
FREQUENCY: 0
NERVOUS/ANXIOUS: 0
CONSTIPATION: 0
BLOOD IN STOOL: 0
PALPITATIONS: 0
HEADACHES: 0
COUGH: 0
HEMATURIA: 0
DYSPHORIC MOOD: 0
DYSURIA: 0
POLYDIPSIA: 0
NAUSEA: 0
VOMITING: 0
WHEEZING: 0
ADENOPATHY: 0
SHORTNESS OF BREATH: 0
BRUISES/BLEEDS EASILY: 0
RHINORRHEA: 0
DIARRHEA: 0
FEVER: 0
MYALGIAS: 0
ARTHRALGIAS: 0
DIZZINESS: 0
EYE REDNESS: 0
FATIGUE: 0
ABDOMINAL PAIN: 0
EYE DISCHARGE: 0
CHILLS: 0
POLYPHAGIA: 0
SORE THROAT: 0

## 2025-03-10 NOTE — PROGRESS NOTES
Subjective   Patient ID: Anand Hartman is a 84 y.o. male who presents for Medicare Annual Wellness Visit Subsequent.    This is a medicare wellness. Pls ask all medicare questions  Is pt fasting? Had black coffee with a little sugar   Does pt see any providers other than care team below:  No  J. Taylor, lally, brown, schwab    Any forms to fill out? No   Does pt want to discuss quitting smoking? Yes   Bps at home- does not do   Does pt want a pneumonia shot? No   Any questions or concerns today?   Patient says that his lip and chin in numb, just in one little spot     No care team member to display    HPI  Last labs-6/2024 Potassium is a little high.  Stop any multivitamins or any supplements with potassium in it.  Kidney function is lower than last time. Increase fluids. Decrease ibuprofen, motrin, advil, naproxen, aleve and other anti-inflammatories. Eat a low sodium balanced diet. Exercise regularly. Keep your blood pressure <130/<80. Keep sugars to goal. Decrease alcohol and stop smoking if applicable.  Recheck lab in 2wks for potassium and kidney function. No fasting needed.  5/2024 A1c 5.8  9/2023 Hdl and trigs normal.  Ldl just slightly high at 104. Gaol <100. Decr fats and incr fiber.   liver function and electrolytes in the CMP (comprehensive metabolic panel) were normal.  Sugar high at 103. Goal <100. Decr carbs and sugars. Pt has known prediabetes.  Kidney function stable. Continue to increase fluids and decrease ibuprofen, motrin, advil, naproxen, aleve and other anti-inflammatories. Eat a low sodium balanced diet. Exercise regularly. Keep your blood pressure <130/<80. Keep sugars to goal.  CBC (complete blood count) was normal which looks at infection and anemia markers.  PSA (prostate blood test) is normal.  4/2023 A1c 5.9, kidney function stable; 9/14/22 cmp-sugar 111-gfr 57, ldl 107, psa nl, cbc nl   Due for labs- all    Cholesterol   Date Value Ref Range Status   09/26/2023 176 0 - 199 mg/dL Final      Comment:     .      AGE      DESIRABLE   BORDERLINE HIGH   HIGH     0-19 Y     0 - 169       170 - 199     >/= 200    20-24 Y     0 - 189       190 - 224     >/= 225         >24 Y     0 - 199       200 - 239     >/= 240   **All ranges are based on fasting samples. Specific   therapeutic targets will vary based on patient-specific   cardiac risk.  .   Pediatric guidelines reference:Pediatrics 2011, 128(S5).   Adult guidelines reference: NCEP ATPIII Guidelines,     COLLETTE 2001, 258:2486-97  .   Venipuncture immediately after or during the    administration of Metamizole may lead to falsely   low results. Testing should be performed immediately   prior to Metamizole dosing.   08/27/2021 157 0 - 199 mg/dL Final     Comment:     .      AGE      DESIRABLE   BORDERLINE HIGH   HIGH     0-19 Y     0 - 169       170 - 199     >/= 200    20-24 Y     0 - 189       190 - 224     >/= 225         >24 Y     0 - 199       200 - 239     >/= 240   **All ranges are based on fasting samples. Specific   therapeutic targets will vary based on patient-specific   cardiac risk.  .   Pediatric guidelines reference:Pediatrics 2011, 128(S5).   Adult guidelines reference: NCEP ATPIII Guidelines,     COLLETTE 2001, 258:2486-97  .   Venipuncture immediately after or during the    administration of Metamizole may lead to falsely   low results. Testing should be performed immediately   prior to Metamizole dosing.     11/25/2020 110 0 - 199 mg/dL Final     Comment:     .      AGE      DESIRABLE   BORDERLINE HIGH   HIGH     0-19 Y     0 - 169       170 - 199     >/= 200    20-24 Y     0 - 189       190 - 224     >/= 225         >24 Y     0 - 199       200 - 239     >/= 240   **All ranges are based on fasting samples. Specific   therapeutic targets will vary based on patient-specific   cardiac risk.  .   Pediatric guidelines reference:Pediatrics 2011, 128(S5).   Adult guidelines reference: NCEP ATPIII Guidelines,     COLLETTE 2001, 258:2486-97  .    Venipuncture immediately after or during the    administration of Metamizole may lead to falsely   low results. Testing should be performed immediately   prior to Metamizole dosing.       Triglycerides   Date Value Ref Range Status   09/26/2023 120 0 - 149 mg/dL Final     Comment:     .      AGE      DESIRABLE   BORDERLINE HIGH   HIGH     VERY HIGH   0 D-90 D    19 - 174         ----         ----        ----  91 D- 9 Y     0 -  74        75 -  99     >/= 100      ----    10-19 Y     0 -  89        90 - 129     >/= 130      ----    20-24 Y     0 - 114       115 - 149     >/= 150      ----         >24 Y     0 - 149       150 - 199    200- 499    >/= 500  .   Venipuncture immediately after or during the    administration of Metamizole may lead to falsely   low results. Testing should be performed immediately   prior to Metamizole dosing.   08/27/2021 103 0 - 149 mg/dL Final     Comment:     .      AGE      DESIRABLE   BORDERLINE HIGH   HIGH     VERY HIGH   0 D-90 D    19 - 174         ----         ----        ----  91 D- 9 Y     0 -  74        75 -  99     >/= 100      ----    10-19 Y     0 -  89        90 - 129     >/= 130      ----    20-24 Y     0 - 114       115 - 149     >/= 150      ----         >24 Y     0 - 149       150 - 199    200- 499    >/= 500  .   Venipuncture immediately after or during the    administration of Metamizole may lead to falsely   low results. Testing should be performed immediately   prior to Metamizole dosing.     11/25/2020 70 0 - 149 mg/dL Final     Comment:     .      AGE      DESIRABLE   BORDERLINE HIGH   HIGH     VERY HIGH   0 D-90 D    19 - 174         ----         ----        ----  91 D- 9 Y     0 -  74        75 -  99     >/= 100      ----    10-19 Y     0 -  89        90 - 129     >/= 130      ----    20-24 Y     0 - 114       115 - 149     >/= 150      ----         >24 Y     0 - 149       150 - 199    200- 499    >/= 500  .   Venipuncture immediately after or during the     "administration of Metamizole may lead to falsely   low results. Testing should be performed immediately   prior to Metamizole dosing.       HDL   Date Value Ref Range Status   09/26/2023 48.2 mg/dL Final     Comment:     .      AGE      VERY LOW   LOW     NORMAL    HIGH       0-19 Y       < 35   < 40     40-45     ----    20-24 Y       ----   < 40       >45     ----      >24 Y       ----   < 40     40-60      >60  .   08/27/2021 40.4 mg/dL Final     Comment:     .      AGE      VERY LOW   LOW     NORMAL    HIGH       0-19 Y       < 35   < 40     40-45     ----    20-24 Y       ----   < 40       >45     ----      >24 Y       ----   < 40     40-60      >60  .     11/25/2020 41.6 mg/dL Final     Comment:     .      AGE      VERY LOW   LOW     NORMAL    HIGH       0-19 Y       < 35   < 40     40-45     ----    20-24 Y       ----   < 40       >45     ----      >24 Y       ----   < 40     40-60      >60  .       No results found for: \"LDL\"  TSH   Date Value Ref Range Status   08/27/2021 1.89 0.44 - 3.98 mIU/L Final     Comment:      TSH testing is performed using different testing    methodology at [facility] than at other    [facility] hospitals. Direct result comparisons should    only be made within the same method.       No results found for: \"A1C\"  No components found for: \"POCA1C\"  No results found for: \"ALBUR\"  No components found for: \"POCALBUR\"      Other concerns:  lip and chin numb x 2wks; no drooping; no weakness or confusion    Wants to quit smoking; 1 pack a day    Loose stools after eating  Decr appetite    Rt lower abd pain once every 2wks on avg. Lasts seconds. H/o bilat inguinal hernia repair      ER/urgicare visits in the last year- 5/2024 leg cramping  Hospitalizations in the last year- none    FH colon ca-none  FH prostate ca-none      Exercise- walking al ittle but can't go far due to numbness and weakness;can't stand long either  Diet-when hungry -grazes; trying to do ensure  Body mass " index is 16.19 kg/m².    last eye dr appt- last yr  No vision issues    last dental appt- top and bottom dentures    BMs-regular to loose  Sleep-able to fall asleep and stay asleep; no snoring or apnea  no cp, swelling, sob, abd pain, n/v/d/c, blood in stool or black stools  STI testing including hiv (age 15-65) and hep c screening (18-79)-declines  PSA 50-85 with labs      Immunization History   Administered Date(s) Administered    Moderna SARS-CoV-2 Vaccination 03/04/2021, 04/02/2021       fractures in lifetime-knee, toe  FH osteoporosis-none    FH heart attack, heart surgery-mom-pacemaker,  Dad-cad    FH stroke-dad    The ASCVD Risk score (Anuradha THOMASON, et al., 2019) failed to calculate for the following reasons:    The 2019 ASCVD risk score is only valid for ages 40 to 79  Coronary Artery Calcium score:  This test is recommended for men 45 or older and women 55 or older without a history of heart disease and have 1 risk factor (high LDL cholesterol, low HDL cholesterol, high blood pressure, smoker (current or past), type 2 diabetes, IBD, lupus, RA, ankylosing spondylitis, psoriasis or family history of  heart disease <55yrs in dad, brother or child or <65yrs in mom, sister, or child.)       Review of Systems   Constitutional:  Negative for chills, fatigue and fever.   HENT:  Negative for congestion, ear pain, postnasal drip, rhinorrhea and sore throat.    Eyes:  Negative for discharge, redness and visual disturbance.   Respiratory:  Negative for cough, shortness of breath and wheezing.    Cardiovascular:  Negative for chest pain, palpitations and leg swelling.   Gastrointestinal:  Negative for abdominal pain, blood in stool, constipation, diarrhea, nausea and vomiting.   Endocrine: Negative for cold intolerance, polydipsia, polyphagia and polyuria.   Genitourinary:  Negative for dysuria, frequency, genital sores, hematuria, penile pain, testicular pain and urgency.   Musculoskeletal:  Negative for arthralgias and  myalgias.   Skin:  Negative for rash.   Neurological:  Negative for dizziness, syncope and headaches.   Hematological:  Negative for adenopathy. Does not bruise/bleed easily.   Psychiatric/Behavioral:  Negative for dysphoric mood. The patient is not nervous/anxious.        Objective   Visit Vitals  BP 94/66   Pulse 73   Temp 35.6 °C (96 °F)        BP Readings from Last 3 Encounters:   03/12/25 94/66   07/19/24 89/54   05/30/24 126/72     Wt Readings from Last 3 Encounters:   03/12/25 46.9 kg (103 lb 6.4 oz)   07/19/24 48.6 kg (107 lb 3.2 oz)   05/30/24 49.1 kg (108 lb 3.2 oz)           Physical Exam  Vitals reviewed.   Constitutional:       General: He is not in acute distress.     Appearance: Normal appearance. He is not ill-appearing.   HENT:      Head: Normocephalic.      Right Ear: Tympanic membrane, ear canal and external ear normal.      Left Ear: Tympanic membrane, ear canal and external ear normal.      Nose: Nose normal.      Mouth/Throat:      Mouth: Mucous membranes are moist.      Pharynx: Oropharynx is clear. No oropharyngeal exudate or posterior oropharyngeal erythema.   Eyes:      Extraocular Movements: Extraocular movements intact.      Conjunctiva/sclera: Conjunctivae normal.      Pupils: Pupils are equal, round, and reactive to light.   Cardiovascular:      Rate and Rhythm: Normal rate and regular rhythm.      Heart sounds: Normal heart sounds. No murmur heard.  Pulmonary:      Effort: Pulmonary effort is normal. No respiratory distress.      Breath sounds: Normal breath sounds. No wheezing, rhonchi or rales.   Abdominal:      General: Bowel sounds are normal. There is no distension.      Palpations: Abdomen is soft. There is no mass.      Tenderness: There is no abdominal tenderness.   Musculoskeletal:         General: No swelling or tenderness. Normal range of motion.      Cervical back: Normal range of motion and neck supple. No tenderness.      Right lower leg: No edema.      Left lower leg: No  edema.   Lymphadenopathy:      Cervical: No cervical adenopathy.   Skin:     General: Skin is warm.      Findings: No rash.   Neurological:      General: No focal deficit present.      Mental Status: He is alert and oriented to person, place, and time.      Cranial Nerves: No cranial nerve deficit.   Psychiatric:         Mood and Affect: Mood normal.         Behavior: Behavior normal.         Assessment/Plan   Diagnoses and all orders for this visit:  Routine general medical examination at a health care facility  Prediabetes  -     Comprehensive Metabolic Panel; Future  Primary hypertension  -     Lipid Panel; Future  -     Comprehensive Metabolic Panel; Future  -     CBC and Auto Differential; Future  Screening for malignant neoplasm of prostate  -     Prostate Specific Antigen, Screen; Future  Cigarette smoker  -     buPROPion XL (Wellbutrin XL) 150 mg 24 hr tablet; Take 1 tablet (150 mg) by mouth once daily in the morning. Do not crush, chew, or split.  Lip numbness  -     predniSONE (Deltasone) 20 mg tablet; Take 2 tablets (40 mg) by mouth once daily for 5 days.  Other orders  -     Follow Up In Primary Care - Established; Future

## 2025-03-12 ENCOUNTER — APPOINTMENT (OUTPATIENT)
Dept: PRIMARY CARE | Facility: CLINIC | Age: 85
End: 2025-03-12
Payer: COMMERCIAL

## 2025-03-12 VITALS
WEIGHT: 103.4 LBS | OXYGEN SATURATION: 93 % | SYSTOLIC BLOOD PRESSURE: 94 MMHG | HEIGHT: 67 IN | HEART RATE: 73 BPM | BODY MASS INDEX: 16.23 KG/M2 | TEMPERATURE: 96 F | DIASTOLIC BLOOD PRESSURE: 66 MMHG

## 2025-03-12 DIAGNOSIS — R20.0 LIP NUMBNESS: ICD-10-CM

## 2025-03-12 DIAGNOSIS — R73.03 PREDIABETES: ICD-10-CM

## 2025-03-12 DIAGNOSIS — F17.210 CIGARETTE SMOKER: ICD-10-CM

## 2025-03-12 DIAGNOSIS — Z00.00 ROUTINE GENERAL MEDICAL EXAMINATION AT A HEALTH CARE FACILITY: Primary | ICD-10-CM

## 2025-03-12 DIAGNOSIS — I10 PRIMARY HYPERTENSION: ICD-10-CM

## 2025-03-12 DIAGNOSIS — Z12.5 SCREENING FOR MALIGNANT NEOPLASM OF PROSTATE: ICD-10-CM

## 2025-03-12 PROCEDURE — 3074F SYST BP LT 130 MM HG: CPT | Performed by: NURSE PRACTITIONER

## 2025-03-12 PROCEDURE — 3078F DIAST BP <80 MM HG: CPT | Performed by: NURSE PRACTITIONER

## 2025-03-12 PROCEDURE — 1159F MED LIST DOCD IN RCRD: CPT | Performed by: NURSE PRACTITIONER

## 2025-03-12 PROCEDURE — G0439 PPPS, SUBSEQ VISIT: HCPCS | Performed by: NURSE PRACTITIONER

## 2025-03-12 PROCEDURE — 1170F FXNL STATUS ASSESSED: CPT | Performed by: NURSE PRACTITIONER

## 2025-03-12 PROCEDURE — 1123F ACP DISCUSS/DSCN MKR DOCD: CPT | Performed by: NURSE PRACTITIONER

## 2025-03-12 RX ORDER — BUPROPION HYDROCHLORIDE 150 MG/1
150 TABLET ORAL EVERY MORNING
Qty: 30 TABLET | Refills: 5 | Status: SHIPPED | OUTPATIENT
Start: 2025-03-12 | End: 2025-09-08

## 2025-03-12 RX ORDER — PREDNISONE 20 MG/1
40 TABLET ORAL DAILY
Qty: 10 TABLET | Refills: 0 | Status: SHIPPED | OUTPATIENT
Start: 2025-03-12 | End: 2025-03-17

## 2025-03-12 ASSESSMENT — PATIENT HEALTH QUESTIONNAIRE - PHQ9
1. LITTLE INTEREST OR PLEASURE IN DOING THINGS: NOT AT ALL
2. FEELING DOWN, DEPRESSED OR HOPELESS: SEVERAL DAYS
SUM OF ALL RESPONSES TO PHQ9 QUESTIONS 1 AND 2: 1
10. IF YOU CHECKED OFF ANY PROBLEMS, HOW DIFFICULT HAVE THESE PROBLEMS MADE IT FOR YOU TO DO YOUR WORK, TAKE CARE OF THINGS AT HOME, OR GET ALONG WITH OTHER PEOPLE: NOT DIFFICULT AT ALL

## 2025-03-12 ASSESSMENT — ACTIVITIES OF DAILY LIVING (ADL)
DOING_HOUSEWORK: INDEPENDENT
GROCERY_SHOPPING: INDEPENDENT
DRESSING: INDEPENDENT
TAKING_MEDICATION: INDEPENDENT
MANAGING_FINANCES: INDEPENDENT
BATHING: INDEPENDENT

## 2025-03-12 NOTE — PATIENT INSTRUCTIONS
1 ensure a day  1 fiber gummy a day  Consider gi dr if continue to lose wt    Call [facility] and see if they cover nicotine patch and gum/lozenge is I send it in as a rx    Start wellbutrin to quit smoking; stop smoking after on the med for 7d. Quit on day 7.  Clean house/drapes  Change habits-tea instead of coffee. Use front porch instead of back one. Etc.  Have 3 things to do instead of smoking  Start to decr by 1 cig a day.    For lip and chin numbness:  Prednisone 2 at the same time x 5d with food  If not gone in 2wks, I will order mri brain and refer to neuro  No advil, motrin, ibuprofen, aleve, naproxen or other anti-inflammatories while on prednisone.  Tylenol (acetaminophen) is OK to take.     Let me know if rt lower abdominal pain lasts longer----> assess for hernia        Handouts given to pt:  physical handout        Labs- No appt needed:    You can use the lab in our building when fasting. The hrs are: Mon-Fri 7a-330p.  No Saturday hrs. Bring the paper order.   OR   [facility] Mon-Fri 7a-12p. No Saturday hrs. Bring the paper order.  OR   [facility] Mon-Fri 630a-530p or Sat 6:30a-12p. Bring the paper order  OR  [facility] Mon-FrI 7a-5p  [facility] Mon-Fri 730a-4p, Sat  8a-12p  [facility] [address] Mon-Thurs 630a-6p , Fri 630a-4p, Sat 8a-12p  [facility] [address] Mon-Fri 7a-630p and Sat. 7a-3p    Fasting is no food, drink, gum or mints other than water for 12 hrs.   Results will be back in 2-3 business days for most labs. It is always recommended for any orders (labs, xrays, ultrasounds,MRI, ct scan, procedures etc) to check with your insurance provider for expected costs or expenses to you.         You will get your results via phone from my medical assistant if you do not have MyChart.  OR  You will get your results via [employer]    If a result is urgent, I will call to speak to you.    Vaccines:  Declines pneumonia shot  Can get tdap,  rsv, and shingles vaccines at a pharmacy    General recommendations:  Exercise-cardio 4-5d/wk 30min each day  Diet-Breakfast-toast (my favorite Pooja Watson Delighful Multigrain or Sixto's Killer Bread Good Seed thin-sliced)/bagel/English muffin-whole wheat flour as a 1st ingredient or cereal/oatmeal/granola bar-fiber 4g or more or protein like eggs or peanut butter; optional veggies  Lunch-protein, 1/2c carb or 2 slices bread, veg 1c  Dinner-protein, fist sized carb, veg 1c  Fruit 2 a day  Dairy 2 a day-milk, soy milk, almond milk, cheese, yogurt, cottage cheese  Snacks-Protein-hard boiled egg, nuts (walnuts/almonds/pecans/pistachios 1/4c), hummus, beef/deer jerky or meat sticks; vegetable, fruit, dairy-milk(1%, skim, almond, soy)/cheese (not a lot of cheddar)/yogurt (Greek is best-my favorite Dannon Fruit on the Bottom Greek)/cottage cheese 2%; triscuits/ popcorn/wheat thins have a lot of fiber; follow serving size on bag/box/container  increase water  Limit alcohol to 1 drink per day for women and 2 drinks per day for men (1 drink=12oz beer or 5oz wine or 1 1/2oz liquor)  Calcium: 500mg 1 twice a day if age 50 and younger and 600mg 1 twice a day if over age 50 (calcium citrate can be taken without food)  Vitamin D: 800-5000 IU/day  Limit salt to <2300mg a day if age 50 and under and <1500mg a day if over age 50/have high bp or diabetes or kidney disease  Recommend folate for childbearing age women 0.4mg per day (can be found in a multivitamin)  Recommend 18mg/dL of iron a day if age 50 and under and 8mg/dL a day if over age 50; take on an empty stomach at bedtime  Use sunscreen   Wear seatbelt  Recommend safe sex practices: using condoms everytime you have sex, discuss with a new partner about their past partners/history of STDs/drug use, avoid drinking alcohol or using drugs as this increases the chance that you will participate in high-risk sex, for oral sex help protect your mouth by having your partner use a  condom (male or female), women should not douche after sex, be aware of your partner's body and your body-look for signs of a sore, blister, rash, or discharge, and have regular exams and periodic tests for STDs.  No distracted driving  No driving when under influence of substances  Wear a seatbelt  Eye dr every 1-2yrs  Dentist every 6-12 mon  Tetanus shot every 10yrs  Recommend flu vaccine in the fall  Appt in 6mon for follow up on sugar, bp and 1 year for physical      I will communicate with you via [employer] regarding messages and results. If you need help with this, you can call the support line at [phone].    IT WAS A PLEASURE TO SEE YOU TODAY. THANK YOU FOR CHOOSING US FOR YOUR HEALTHCARE NEEDS.

## 2025-03-13 LAB
NON-UH HIE A/G RATIO: 1
NON-UH HIE ALB: 3.4 G/DL (ref 3.4–5)
NON-UH HIE ALK PHOS: 95 UNIT/L (ref 45–117)
NON-UH HIE BASO COUNT: 0.04 X1000 (ref 0–0.2)
NON-UH HIE BASOS %: 0.5 %
NON-UH HIE BILIRUBIN, TOTAL: 0.5 MG/DL (ref 0.3–1.2)
NON-UH HIE BUN/CREAT RATIO: 15
NON-UH HIE BUN: 21 MG/DL (ref 9–23)
NON-UH HIE CALCIUM: 9.6 MG/DL (ref 8.7–10.4)
NON-UH HIE CALCULATED LDL CHOLESTEROL: 84 MG/DL (ref 60–130)
NON-UH HIE CALCULATED OSMOLALITY: 280 MOSM/KG (ref 275–295)
NON-UH HIE CHLORIDE: 102 MMOL/L (ref 98–107)
NON-UH HIE CHOLESTEROL: 158 MG/DL (ref 100–200)
NON-UH HIE CO2, VENOUS: 25 MMOL/L (ref 20–31)
NON-UH HIE CREATININE: 1.4 MG/DL (ref 0.6–1.1)
NON-UH HIE DIFF?: ABNORMAL
NON-UH HIE DXH ACTIONS: ABNORMAL
NON-UH HIE EOS COUNT: 0.08 X1000 (ref 0–0.5)
NON-UH HIE EOSIN %: 1 %
NON-UH HIE GFR AA: 58
NON-UH HIE GLOBULIN: 3.3 G/DL
NON-UH HIE GLOMERULAR FILTRATION RATE: 48 ML/MIN/1.73M?
NON-UH HIE GLUCOSE: 124 MG/DL (ref 74–106)
NON-UH HIE GOT: 17 UNIT/L (ref 15–37)
NON-UH HIE GPT: 10 UNIT/L (ref 10–49)
NON-UH HIE HCT: 42.6 % (ref 41–52)
NON-UH HIE HDL CHOLESTEROL: 43 MG/DL (ref 40–60)
NON-UH HIE HGB: 13.8 G/DL (ref 13.5–17.5)
NON-UH HIE INSTR WBC: 8.6
NON-UH HIE K: 5 MMOL/L (ref 3.5–5.1)
NON-UH HIE LYMPH %: 19.6 %
NON-UH HIE LYMPH COUNT: 1.68 X1000 (ref 1.2–4.8)
NON-UH HIE MCH: 28.4 PG (ref 27–34)
NON-UH HIE MCHC: 32.5 G/DL (ref 32–37)
NON-UH HIE MCV: 87.5 FL (ref 80–100)
NON-UH HIE MONO %: 10.6 %
NON-UH HIE MONO COUNT: 0.91 X1000 (ref 0.1–1)
NON-UH HIE MPV: 6.6 FL (ref 7.4–10.4)
NON-UH HIE NA: 138 MMOL/L (ref 135–145)
NON-UH HIE NEUTROPHIL %: 68.3 %
NON-UH HIE NEUTROPHIL COUNT (ANC): 5.86 X1000 (ref 1.4–8.8)
NON-UH HIE NUCLEATED RBC: 0 /100WBC
NON-UH HIE PLATELET: 452 X10 (ref 150–450)
NON-UH HIE RBC: 4.86 X10 (ref 4.7–6.1)
NON-UH HIE RDW: 18.9 % (ref 11.5–14.5)
NON-UH HIE RED BLOOD CELL MORPHOLOGY: ABNORMAL
NON-UH HIE SCAN DIFFERENTIAL: ABNORMAL
NON-UH HIE TOTAL CHOL/HDL CHOL RATIO: 3.7
NON-UH HIE TOTAL PROTEIN: 6.7 G/DL (ref 5.7–8.2)
NON-UH HIE TRIGLYCERIDES: 155 MG/DL (ref 30–150)
NON-UH HIE WBC: 8.6 X10 (ref 4.5–11)

## 2025-03-14 ENCOUNTER — TELEPHONE (OUTPATIENT)
Dept: PRIMARY CARE | Facility: CLINIC | Age: 85
End: 2025-03-14
Payer: COMMERCIAL

## 2025-03-14 DIAGNOSIS — N18.31 STAGE 3A CHRONIC KIDNEY DISEASE (MULTI): Primary | ICD-10-CM

## 2025-03-14 DIAGNOSIS — R79.89 ELEVATED PLATELET COUNT: ICD-10-CM

## 2025-03-14 LAB — NON-UH HIE PROSTATIC SPECIFIC AG SCREEN: 2.6 NG/ML (ref 0–4)

## 2025-03-14 NOTE — TELEPHONE ENCOUNTER
----- Message from Amanda Barr sent at 3/14/2025  7:35 AM EDT -----  Platelets slightly high. Recheck lab in 2wks.  Kidney function is lower than normal. Increase fluids. Decrease ibuprofen, motrin, advil, naproxen, aleve and other anti-inflammatories. Eat a low sodium balanced diet. Exercise regularly. Keep your blood pressure <130/<80. Keep sugars to goal. Decrease alcohol and stop smoking if applicable.   It was low like this 9mon ago. I recommend seeing a nephrologist-kidney dr. You can call [phone] to schedule an appt.  Sugar high at 124. Goal <100. Decr carbs and sugars in the diet.   liver function and electrolytes in the CMP (comprehensive metabolic panel) were normal.  Ldl and hdl normal.  Trigs slightly high at 155. Goal <150. Decr carbs and sugars.

## 2025-03-14 NOTE — TELEPHONE ENCOUNTER
----- Message from Amanda Barr sent at 3/14/2025 11:32 AM EDT -----  PSA (prostate blood test) is normal.

## 2025-03-20 ENCOUNTER — OFFICE VISIT (OUTPATIENT)
Dept: PRIMARY CARE | Facility: CLINIC | Age: 85
End: 2025-03-20
Payer: COMMERCIAL

## 2025-03-20 VITALS
TEMPERATURE: 97.5 F | BODY MASS INDEX: 16.26 KG/M2 | WEIGHT: 103.6 LBS | OXYGEN SATURATION: 98 % | HEIGHT: 67 IN | HEART RATE: 70 BPM | SYSTOLIC BLOOD PRESSURE: 118 MMHG | DIASTOLIC BLOOD PRESSURE: 69 MMHG

## 2025-03-20 DIAGNOSIS — R11.2 NAUSEA AND VOMITING, UNSPECIFIED VOMITING TYPE: ICD-10-CM

## 2025-03-20 DIAGNOSIS — M25.552 LEFT HIP PAIN: Primary | ICD-10-CM

## 2025-03-20 PROCEDURE — 1123F ACP DISCUSS/DSCN MKR DOCD: CPT | Performed by: NURSE PRACTITIONER

## 2025-03-20 PROCEDURE — 99213 OFFICE O/P EST LOW 20 MIN: CPT | Performed by: NURSE PRACTITIONER

## 2025-03-20 PROCEDURE — 3074F SYST BP LT 130 MM HG: CPT | Performed by: NURSE PRACTITIONER

## 2025-03-20 PROCEDURE — 1159F MED LIST DOCD IN RCRD: CPT | Performed by: NURSE PRACTITIONER

## 2025-03-20 PROCEDURE — 3078F DIAST BP <80 MM HG: CPT | Performed by: NURSE PRACTITIONER

## 2025-03-20 RX ORDER — ONDANSETRON 4 MG/1
4 TABLET, FILM COATED ORAL EVERY 8 HOURS PRN
Qty: 30 TABLET | Refills: 1 | Status: SHIPPED | OUTPATIENT
Start: 2025-03-20 | End: 2025-04-09

## 2025-03-20 RX ORDER — GABAPENTIN 100 MG/1
100 CAPSULE ORAL 3 TIMES DAILY PRN
Qty: 90 CAPSULE | Refills: 5 | Status: SHIPPED | OUTPATIENT
Start: 2025-03-20 | End: 2025-09-16

## 2025-03-20 ASSESSMENT — ENCOUNTER SYMPTOMS
CONSTITUTIONAL NEGATIVE: 1
SHORTNESS OF BREATH: 0
WHEEZING: 0

## 2025-03-20 NOTE — PATIENT INSTRUCTIONS
1st step:  Voltaren gel otc  Tylenol arthritis instead of ibuprofen otc  Nausea med-zofran rx    2nd step if above not helping pain:  Gabapentin with crackers-watch for confusion or sleepiness or unsteadiness  Still can use nausea med    Xray lf hip today    Push fluids    Do not take advil, motrin, ibuprofen, aleve or naproxen due to decr kidney function    We will call Monday to get a status update  Next: pain med like tramadol and seeing rheum or ortho dr Keven aguillon appt      I will communicate with you via [facility] regarding messages and results. If you need help with this, you can call the support line at [phone].    IT WAS A PLEASURE TO SEE YOU TODAY. THANK YOU FOR CHOOSING US FOR YOUR HEALTHCARE NEEDS.

## 2025-03-20 NOTE — PROGRESS NOTES
Subjective   Patient ID: Anand Hartman is a 84 y.o. male who presents for Hip Pain.  Last physical: 3/12/25; has sept appt scheduled  Last albs-3/2025 Platelets slightly high. Recheck lab in 2wks.  Kidney function is lower than normal. Increase fluids. Decrease ibuprofen, motrin, advil, naproxen, aleve and other anti-inflammatories. Eat a low sodium balanced diet. Exercise regularly. Keep your blood pressure <130/<80. Keep sugars to goal. Decrease alcohol and stop smoking if applicable.  It was low like this 9mon ago. I recommend seeing a nephrologist-kidney . You can call [phone] to schedule an appt.  Sugar high at 124. Goal <100. Decr carbs and sugars in the diet.  liver function and electrolytes in the CMP (comprehensive metabolic panel) were normal.  Ldl and hdl normal.  Trigs slightly high at 155. Goal <150. Decr carbs and sugars.  Psa normal    When did pain in lf hip start? last night   Any fall or injury? No   Hurts to lay down, feeling like he will throw up. Has not eaten or slept since yesterday.     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    Respiratory:  Negative for shortness of breath and wheezing.    Cardiovascular:  Negative for chest pain.   Musculoskeletal:         Lf hip pain       Objective   Visit Vitals  /69   Pulse 70   Temp 36.4 °C (97.5 °F)      BP Readings from Last 3 Encounters:   03/20/25 118/69   03/12/25 94/66   07/19/24  "89/54     Wt Readings from Last 3 Encounters:   03/20/25 47 kg (103 lb 9.6 oz)   03/12/25 46.9 kg (103 lb 6.4 oz)   07/19/24 48.6 kg (107 lb 3.2 oz)       Physical Exam  Constitutional:       General: He is not in acute distress.     Appearance: Normal appearance.   Musculoskeletal:      Comments: Lf hip-red skin area 3\" at outer hip bone; pain to palpate area. No rash in that area.   Neurological:      Mental Status: He is alert.         Assessment/Plan   Diagnoses and all orders for this visit:  Left hip pain  -     gabapentin (Neurontin) 100 mg capsule; Take 1 capsule (100 mg) by mouth 3 times a day as needed (hip pain).  -     XR hip left with pelvis when performed 2 or 3 views; Future  Nausea and vomiting, unspecified vomiting type  -     ondansetron (Zofran) 4 mg tablet; Take 1 tablet (4 mg) by mouth every 8 hours if needed for nausea or vomiting for up to 20 days.        See patient instructions for full plan         "

## 2025-03-24 ENCOUNTER — TELEPHONE (OUTPATIENT)
Dept: PRIMARY CARE | Facility: CLINIC | Age: 85
End: 2025-03-24
Payer: COMMERCIAL

## 2025-03-25 ENCOUNTER — TELEPHONE (OUTPATIENT)
Dept: PRIMARY CARE | Facility: CLINIC | Age: 85
End: 2025-03-25
Payer: COMMERCIAL

## 2025-03-25 NOTE — TELEPHONE ENCOUNTER
----- Message from Amanda Barr sent at 3/25/2025 11:21 AM EDT -----  Xray hip normal.  No significant change from 9/2023 hip xray

## 2025-03-26 ENCOUNTER — TELEPHONE (OUTPATIENT)
Dept: PRIMARY CARE | Facility: CLINIC | Age: 85
End: 2025-03-26
Payer: COMMERCIAL

## 2025-03-26 DIAGNOSIS — R20.0 LIP NUMBNESS: Primary | ICD-10-CM

## 2025-03-28 DIAGNOSIS — R79.89 ELEVATED PLATELET COUNT: ICD-10-CM

## 2025-07-18 ENCOUNTER — TELEPHONE (OUTPATIENT)
Dept: PRIMARY CARE | Facility: CLINIC | Age: 85
End: 2025-07-18
Payer: COMMERCIAL

## 2025-07-18 NOTE — TELEPHONE ENCOUNTER
"Patient called requesting antibiotic for a UTI. Informed patient that provider would need to see the patient in order to diginose and treat pt claimed \"Johnna had a uti before I know what it feels like, can you just put a message in\"     Informed patient that I would send a message but provider is not in office so I can not guarantee provider will send anything in     Instructed pt to go to urgent care or give a call back if anything worsens and wants to be seen for treatment.   "

## 2025-09-15 ENCOUNTER — APPOINTMENT (OUTPATIENT)
Dept: PRIMARY CARE | Facility: CLINIC | Age: 85
End: 2025-09-15
Payer: COMMERCIAL